# Patient Record
Sex: MALE | Race: ASIAN | NOT HISPANIC OR LATINO | Employment: FULL TIME | URBAN - METROPOLITAN AREA
[De-identification: names, ages, dates, MRNs, and addresses within clinical notes are randomized per-mention and may not be internally consistent; named-entity substitution may affect disease eponyms.]

---

## 2022-10-25 ENCOUNTER — LAB (OUTPATIENT)
Dept: LAB | Facility: HOSPITAL | Age: 47
End: 2022-10-25

## 2022-10-25 ENCOUNTER — OFFICE VISIT (OUTPATIENT)
Dept: GASTROENTEROLOGY | Facility: CLINIC | Age: 47
End: 2022-10-25

## 2022-10-25 VITALS
SYSTOLIC BLOOD PRESSURE: 123 MMHG | DIASTOLIC BLOOD PRESSURE: 80 MMHG | HEIGHT: 72 IN | HEART RATE: 54 BPM | WEIGHT: 173.2 LBS | BODY MASS INDEX: 23.46 KG/M2 | OXYGEN SATURATION: 99 % | TEMPERATURE: 97.1 F

## 2022-10-25 DIAGNOSIS — R10.13 EPIGASTRIC PAIN: Primary | ICD-10-CM

## 2022-10-25 DIAGNOSIS — R63.4 ABNORMAL WEIGHT LOSS: ICD-10-CM

## 2022-10-25 DIAGNOSIS — K85.90 ACUTE PANCREATITIS, UNSPECIFIED COMPLICATION STATUS, UNSPECIFIED PANCREATITIS TYPE: ICD-10-CM

## 2022-10-25 PROCEDURE — 83690 ASSAY OF LIPASE: CPT | Performed by: INTERNAL MEDICINE

## 2022-10-25 PROCEDURE — 36415 COLL VENOUS BLD VENIPUNCTURE: CPT | Performed by: INTERNAL MEDICINE

## 2022-10-25 PROCEDURE — 82150 ASSAY OF AMYLASE: CPT | Performed by: INTERNAL MEDICINE

## 2022-10-25 PROCEDURE — 80053 COMPREHEN METABOLIC PANEL: CPT | Performed by: INTERNAL MEDICINE

## 2022-10-25 PROCEDURE — 85027 COMPLETE CBC AUTOMATED: CPT | Performed by: INTERNAL MEDICINE

## 2022-10-25 PROCEDURE — 81377 HLA II TYPE 1 AG EQUIV LR: CPT | Performed by: INTERNAL MEDICINE

## 2022-10-25 PROCEDURE — 99204 OFFICE O/P NEW MOD 45 MIN: CPT | Performed by: INTERNAL MEDICINE

## 2022-10-25 NOTE — PROGRESS NOTES
GASTROENTEROLOGY OFFICE NOTE  John Kay  [id number]  1975        Chief Complaint   Patient presents with   • History of pancreatitis of unclear etiology requiring ERCP    • Abdominal Pain        HISTORY OF PRESENT ILLNESS:  Patient known to me.  I saw him 18 to 20 years ago when he had unexplained pancreatitis ultimately he underwent ERCP with temporary pancreatic stenting.  Pancreatic and biliary sphincterotomy were completed and after low-fat diet and abstinence from alcohol his pancreatitis resolved.  Those records are currently not available.  Those were paper records that were since acquired and then destroyed by [facility].    He states he has been doing well with nothing to suggest any recurrent pancreatitis.  In the past 2 weeks he is again noted the same pain that he had when he had pancreatitis.  It is an epigastric postprandial abdominal pain which does not reliably radiate to his back and has not been associated with dysphagia to solids or odynophagia but he has some sitophobia restricting his oral intake and therefore some appropriate weight loss as a result.    He denies dysphagia to solids, odynophagia, early satiety, melena, bright red blood per rectum, constipation or diarrhea.    He is here today for further work-up and evaluation of what appears to him to be a recurrence of previous pancreatitis.    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    ALLERGIES  No Known  "Allergies    FAMILY HISTORY:  History reviewed. No pertinent family history.    SOCIAL HISTORY  Social History     Socioeconomic History   • Marital status:    Tobacco Use   • Smoking status: Never   • Smokeless tobacco: Never   Vaping Use   • Vaping Use: Never used   Substance and Sexual Activity   • Alcohol use: Yes     Alcohol/week: 1.0 standard drink     Types: 1 Drinks containing 0.5 oz of alcohol per week     Comment: occasionally   • Drug use: Never   • Sexual activity: Defer     Socioeconomic History:  He works in [employer].   with 2 sons ages 17.  He is a non-smoker and only occasionally drinks alcoholic beverages.         PHYSICAL EXAM   /80 (BP Location: Left arm, Patient Position: Sitting, Cuff Size: Adult)   Pulse 54   Temp 97.1 °F (36.2 °C) (Infrared)   Ht 182.9 cm (72\")   Wt 78.6 kg (173 lb 3.2 oz)   SpO2 99%   BMI 23.49 kg/m²   General: Alert and oriented x 3. In no apparent or acute distress.  and No stigmata of chronic liver disease  HEENT: Anicteric sclerae. Normal oropharynx  Neck: Supple. Without lymphadenopathy  CV: Regular rate and rhythm, S1, S2  Lungs: Clear to ausculation. Without rales, rhonchi and wheezing  Abdomen: Soft abdomen normal bowel sounds but some tenderness in the epigastrium with deep palpation without rebound or guarding.  Normal bowel sounds are noted  Extremeties: without clubbing, cyanosis or edema  Neurologic:  Alert and oriented x 3 without focal motor or sensory deficits  Rectal exam: deferred       ASSESSMENT  1.-Epigastric abdominal pain.  The fact that this is reminiscent to him of the same pain that he had when he had documented pancreatitis would suggest recurrence of this problem.  I would suspect perhaps some restenosis of his pancreatic sphincterotomy.  He seems to be improving I think conservative measures might suffice.  We need to check his amylase, lipase and at this point since he is improving I will defer imaging studies such as " ultrasound or CAT scan but will reconsider given his clinical progress.  2.-History of pancreatitis status post ERCP with pancreatic and biliary sphincterotomies with temporary pancreatic stenting    PLAN  1.-  Check amylase, lipase, CBC, CMP  2.-  Defer imaging studies  3.-  Recheck amylase and lipase when symptoms exacerbate (standing order placed in chart so we can obtain these when clinically indicated)  4.-  Consider EGD if no evidence of pancreatitis but ongoing abdominal pain  5.-  Return appointment in 6 weeks.  Patient encouraged to call sooner if not progressing  6.-  May need to consider ERCP with revision of his prior sphincterotomies if clinically indicated      Norman Thacker MD  10/25/2022   18:31 EDT          Addendum  Recent labs reviewed and indeed amylases up albeit only slightly so (126)  Lipase, CBC and CBC are otherwise unremarkable but the elevated amylase would confirm that indeed recent exacerbation of symptoms reminiscent of previous bouts of pancreatitis are likely related to recurrent pancreatitis.  I suspect perhaps restenosis of prior sphincterotomy but would proceed conservatively at this time.  Imaging studies, such as CT abdomen, pancreatic protocol, for persistent/recurrent symptoms would be next best step.

## 2022-10-26 LAB
ALBUMIN SERPL-MCNC: 5 G/DL (ref 3.5–5.2)
ALBUMIN/GLOB SERPL: 2.1 G/DL
ALP SERPL-CCNC: 61 U/L (ref 39–117)
ALT SERPL W P-5'-P-CCNC: 18 U/L (ref 1–41)
AMYLASE SERPL-CCNC: 125 U/L (ref 28–100)
ANION GAP SERPL CALCULATED.3IONS-SCNC: 9 MMOL/L (ref 5–15)
AST SERPL-CCNC: 22 U/L (ref 1–40)
BILIRUB SERPL-MCNC: 1 MG/DL (ref 0–1.2)
BUN SERPL-MCNC: 12 MG/DL (ref 6–20)
BUN/CREAT SERPL: 12 (ref 7–25)
CALCIUM SPEC-SCNC: 9.5 MG/DL (ref 8.6–10.5)
CHLORIDE SERPL-SCNC: 103 MMOL/L (ref 98–107)
CO2 SERPL-SCNC: 27 MMOL/L (ref 22–29)
CREAT SERPL-MCNC: 1 MG/DL (ref 0.76–1.27)
DEPRECATED RDW RBC AUTO: 40.9 FL (ref 37–54)
EGFRCR SERPLBLD CKD-EPI 2021: 92.8 ML/MIN/1.73
ERYTHROCYTE [DISTWIDTH] IN BLOOD BY AUTOMATED COUNT: 12.3 % (ref 12.3–15.4)
GLOBULIN UR ELPH-MCNC: 2.4 GM/DL
GLUCOSE SERPL-MCNC: 78 MG/DL (ref 65–99)
HCT VFR BLD AUTO: 41.2 % (ref 37.5–51)
HGB BLD-MCNC: 14.3 G/DL (ref 13–17.7)
LIPASE SERPL-CCNC: 29 U/L (ref 13–60)
MCH RBC QN AUTO: 31.1 PG (ref 26.6–33)
MCHC RBC AUTO-ENTMCNC: 34.7 G/DL (ref 31.5–35.7)
MCV RBC AUTO: 89.6 FL (ref 79–97)
PLATELET # BLD AUTO: 205 10*3/MM3 (ref 140–450)
PMV BLD AUTO: 10.8 FL (ref 6–12)
POTASSIUM SERPL-SCNC: 4.4 MMOL/L (ref 3.5–5.2)
PROT SERPL-MCNC: 7.4 G/DL (ref 6–8.5)
RBC # BLD AUTO: 4.6 10*6/MM3 (ref 4.14–5.8)
SODIUM SERPL-SCNC: 139 MMOL/L (ref 136–145)
WBC NRBC COR # BLD: 6.1 10*3/MM3 (ref 3.4–10.8)

## 2022-10-26 NOTE — PROGRESS NOTES
Recent labs reviewed and indeed amylases up albeit only slightly so.  Lipase, CBC and CBC are otherwise unremarkable but the elevated amylase would confirm that indeed recent exacerbation of symptoms reminiscent of previous bouts of pancreatitis are likely related to recurrent pancreatitis.  I suspect perhaps restenosis of prior sphincterotomy but would proceed conservatively at this time.  Imaging studies, such as CT abdomen, pancreatic protocol, for persistent/recurrent symptoms would be next best step.

## 2022-11-07 LAB
ANNOTATION COMMENT IMP: NORMAL
HLA-DQ8 QL: NEGATIVE
REF LAB TEST METHOD: NORMAL

## 2022-12-20 ENCOUNTER — OFFICE VISIT (OUTPATIENT)
Dept: GASTROENTEROLOGY | Facility: CLINIC | Age: 47
End: 2022-12-20

## 2022-12-20 VITALS
WEIGHT: 175.4 LBS | DIASTOLIC BLOOD PRESSURE: 90 MMHG | HEIGHT: 72 IN | SYSTOLIC BLOOD PRESSURE: 136 MMHG | OXYGEN SATURATION: 99 % | BODY MASS INDEX: 23.76 KG/M2 | HEART RATE: 66 BPM

## 2022-12-20 DIAGNOSIS — K85.90 ACUTE PANCREATITIS, UNSPECIFIED COMPLICATION STATUS, UNSPECIFIED PANCREATITIS TYPE: ICD-10-CM

## 2022-12-20 DIAGNOSIS — Z78.9 AT AVERAGE RISK FOR COLON CANCER: ICD-10-CM

## 2022-12-20 DIAGNOSIS — R10.13 EPIGASTRIC PAIN: Primary | ICD-10-CM

## 2022-12-20 DIAGNOSIS — R63.4 ABNORMAL WEIGHT LOSS: ICD-10-CM

## 2022-12-20 PROCEDURE — 99213 OFFICE O/P EST LOW 20 MIN: CPT | Performed by: INTERNAL MEDICINE

## 2022-12-20 NOTE — PROGRESS NOTES
GASTROENTEROLOGY OFFICE NOTE  John Kay  [id number]  1975      Chief Complaint   Patient presents with   • Pt is here for 6wk F/U for pancreatitis        HISTORY OF PRESENT ILLNESS:  Patient presents for follow-up.  History of unexplained pancreatitis ultimately resolved with biliary and pancreatic sphincterotomies and temporary pancreatic stenting.  Low-fat diet and abstinence from alcohol ultimately resolved these issues.    Had been doing well for a very long time, close to 18 years or so but when I saw him October 26 he been having 2 weeks of abdominal pain which was reminiscent of the pain he had when he had documented pancreatitis.  He was an epigastric postprandial abdominal pain with occasional radiation to the back and not associate with any other localizing GI complaints although he was having some fear of eating, sitophobia, and had restricted his oral intake with some appropriate weight loss.    Conservative management was undertaken without imaging studies.  Labs reveal clinically insignificant elevation of amylase and normal lipase.  Symptoms have been subsiding when we last saw him.    He states that over the last 2 weeks he has been doing well and even eating foods such as fatty foods that he thought might exacerbate his symptoms and he has been tolerating these foods well without any complaints.    For the most part he presents without any new complaints and feels that his presenting complaints have resolved.    PAST MEDICAL HISTORY  History of unexplained pancreatitis resolved following biliary and pancreatic sphincterotomies    PAST SURGICAL HISTORY  Past Surgical History:   • ERCP WITH SPHINCTEROTOMY/PAPILLOTOMY    Early 2000's   • UPPER GASTROINTESTINAL ENDOSCOPY        MEDICATIONS:  No current outpatient medications on file.    ALLERGIES  has No Known Allergies.    FAMILY HISTORY:  Cancer-related family history is not on file.  Colon Cancer-related family history is not on file.    SOCIAL  "HISTORY  He  reports that he has never smoked. He has never used smokeless tobacco. He reports current alcohol use of about 1.0 standard drink per week. He reports that he does not use drugs.   Works in software.   with sons ages 17.  Non-smoker.    PHYSICAL EXAM   /90 (BP Location: Right arm, Patient Position: Sitting, Cuff Size: Adult)   Pulse 66   Ht 182.9 cm (72\")   Wt 79.6 kg (175 lb 6.4 oz)   SpO2 99%   BMI 23.79 kg/m²   General: Pleasant male in no apparent acute distress  HEENT: Anicteric sclera  Neck: Without observed rigidity  Lungs: Grossly normal respiration without labored breathing observed  Abdomen: Without visible distention.  Extremeties: Moving extremities normally  Neurologic: Normal cognition and affect.  In good spirits.  Alert and oriented        ASSESSMENT  1.-  Spontaneous resolution of abdominal pain reminiscent of his pancreatitis type pain.  In the absence of any alarm symptoms further work-up or evaluation is not necessary at this time but imaging studies and consideration of repeat ERCP for possible restenosis should be considered if symptoms recur.  2.-  Average risk for cancer.  Colonoscopy for routine colon cancer screening recommended.  He is amenable to this  3.-  History of post ERCP pancreatitis    PLAN  1.-  Schedule screening colonoscopy  2.-  Conservative management of his now resolved abdominal pain consider imaging studies such as MRCP versus pancreatic protocol CT and possible ERCP pending his clinical progress      Norman Thacker MD  12/20/2022   16:31 EST                  "

## 2022-12-30 RX ORDER — SODIUM, POTASSIUM,MAG SULFATES 17.5-3.13G
SOLUTION, RECONSTITUTED, ORAL ORAL
Qty: 354 ML | Refills: 0 | Status: SHIPPED | OUTPATIENT
Start: 2022-12-30

## 2023-10-13 ENCOUNTER — TRANSCRIBE ORDERS (OUTPATIENT)
Dept: LAB | Facility: HOSPITAL | Age: 48
End: 2023-10-13
Payer: COMMERCIAL

## 2023-10-13 DIAGNOSIS — Z12.5 SPECIAL SCREENING FOR MALIGNANT NEOPLASM OF PROSTATE: ICD-10-CM

## 2023-10-13 DIAGNOSIS — N40.0 ENLARGED PROSTATE: Primary | ICD-10-CM

## 2023-10-14 ENCOUNTER — LAB (OUTPATIENT)
Dept: LAB | Facility: HOSPITAL | Age: 48
End: 2023-10-14
Payer: COMMERCIAL

## 2023-10-14 DIAGNOSIS — N40.0 ENLARGED PROSTATE: ICD-10-CM

## 2023-10-14 DIAGNOSIS — R10.13 EPIGASTRIC PAIN: ICD-10-CM

## 2023-10-14 DIAGNOSIS — Z12.5 SPECIAL SCREENING FOR MALIGNANT NEOPLASM OF PROSTATE: ICD-10-CM

## 2023-10-14 LAB
AMYLASE SERPL-CCNC: 120 U/L (ref 28–100)
BASOPHILS # BLD AUTO: 0.05 10*3/MM3 (ref 0–0.2)
BASOPHILS NFR BLD AUTO: 0.5 % (ref 0–1.5)
BUN SERPL-MCNC: 18 MG/DL (ref 6–20)
CALCIUM SPEC-SCNC: 9.9 MG/DL (ref 8.6–10.5)
CREAT SERPL-MCNC: 0.98 MG/DL (ref 0.76–1.27)
DEPRECATED RDW RBC AUTO: 40.6 FL (ref 37–54)
EGFRCR SERPLBLD CKD-EPI 2021: 94.5 ML/MIN/1.73
EOSINOPHIL # BLD AUTO: 0.35 10*3/MM3 (ref 0–0.4)
EOSINOPHIL NFR BLD AUTO: 3.8 % (ref 0.3–6.2)
ERYTHROCYTE [DISTWIDTH] IN BLOOD BY AUTOMATED COUNT: 12.2 % (ref 12.3–15.4)
HCT VFR BLD AUTO: 43.9 % (ref 37.5–51)
HGB BLD-MCNC: 14.8 G/DL (ref 13–17.7)
IMM GRANULOCYTES # BLD AUTO: 0.05 10*3/MM3 (ref 0–0.05)
IMM GRANULOCYTES NFR BLD AUTO: 0.5 % (ref 0–0.5)
LIPASE SERPL-CCNC: 27 U/L (ref 13–60)
LYMPHOCYTES # BLD AUTO: 2.66 10*3/MM3 (ref 0.7–3.1)
LYMPHOCYTES NFR BLD AUTO: 29.2 % (ref 19.6–45.3)
MCH RBC QN AUTO: 30.4 PG (ref 26.6–33)
MCHC RBC AUTO-ENTMCNC: 33.7 G/DL (ref 31.5–35.7)
MCV RBC AUTO: 90.1 FL (ref 79–97)
MONOCYTES # BLD AUTO: 0.59 10*3/MM3 (ref 0.1–0.9)
MONOCYTES NFR BLD AUTO: 6.5 % (ref 5–12)
NEUTROPHILS NFR BLD AUTO: 5.42 10*3/MM3 (ref 1.7–7)
NEUTROPHILS NFR BLD AUTO: 59.5 % (ref 42.7–76)
NRBC BLD AUTO-RTO: 0 /100 WBC (ref 0–0.2)
PLATELET # BLD AUTO: 234 10*3/MM3 (ref 140–450)
PMV BLD AUTO: 9.9 FL (ref 6–12)
PSA SERPL-MCNC: 0.68 NG/ML (ref 0–4)
RBC # BLD AUTO: 4.87 10*6/MM3 (ref 4.14–5.8)
WBC NRBC COR # BLD: 9.12 10*3/MM3 (ref 3.4–10.8)

## 2023-10-14 PROCEDURE — 82565 ASSAY OF CREATININE: CPT

## 2023-10-14 PROCEDURE — 84520 ASSAY OF UREA NITROGEN: CPT

## 2023-10-14 PROCEDURE — 36415 COLL VENOUS BLD VENIPUNCTURE: CPT

## 2023-10-14 PROCEDURE — 82310 ASSAY OF CALCIUM: CPT

## 2023-10-14 PROCEDURE — 83690 ASSAY OF LIPASE: CPT

## 2023-10-14 PROCEDURE — 82150 ASSAY OF AMYLASE: CPT

## 2023-10-14 PROCEDURE — 85025 COMPLETE CBC W/AUTO DIFF WBC: CPT

## 2023-10-14 PROCEDURE — G0103 PSA SCREENING: HCPCS

## 2023-10-17 ENCOUNTER — TELEPHONE (OUTPATIENT)
Dept: GASTROENTEROLOGY | Facility: CLINIC | Age: 48
End: 2023-10-17
Payer: COMMERCIAL

## 2023-10-17 NOTE — TELEPHONE ENCOUNTER
"Notified Pt of results per Dr. Thacker.   Pt stated that he had labs drawn by his PCP and that they tho his labs from Dr. Thacker and he did not tell them too. The labs were only for if he was having symptoms. He stated he was not having any symptoms and said that he had not ate 10 hours prior to the labs.  He said he still is not having any symptoms and no pain.    His labs are basically normal.  He is not having any symptoms.  I do not see any need for any further work-up or evaluation.  He just needs to keep us updated if and when his symptoms recur.  Tell him I said \"hi \"!  "

## 2023-10-17 NOTE — PROGRESS NOTES
Patient with longstanding history of unexplained pancreatitis.  Very mild elevation of amylase noted unlikely to suggest acute pancreatitis (typically elevations 3 times greater than the upper limits of normal is required for a diagnosis of acute Pancreatitis)    Josephien: Please call patient and let me know that amylase was drawn when he was symptomatic and if he remains symptomatic so we can decide what further work-up, if any is necessary.

## 2023-10-17 NOTE — TELEPHONE ENCOUNTER
----- Message from Norman Thacker MD sent at 10/17/2023  7:15 AM EDT -----  Patient with longstanding history of unexplained pancreatitis.  Very mild elevation of amylase noted unlikely to suggest acute pancreatitis (typically elevations 3 times greater than the upper limits of normal is required for a diagnosis of acute Pancreatitis)    Josephine: Please call patient and let me know that amylase was drawn when he was symptomatic and if he remains symptomatic so we can decide what further work-up, if any is necessary.

## 2024-02-02 ENCOUNTER — OFFICE VISIT (OUTPATIENT)
Dept: INTERNAL MEDICINE | Facility: CLINIC | Age: 49
End: 2024-02-02
Payer: COMMERCIAL

## 2024-02-02 VITALS
WEIGHT: 182.2 LBS | RESPIRATION RATE: 18 BRPM | BODY MASS INDEX: 26.08 KG/M2 | DIASTOLIC BLOOD PRESSURE: 82 MMHG | SYSTOLIC BLOOD PRESSURE: 132 MMHG | HEIGHT: 70 IN | HEART RATE: 76 BPM | TEMPERATURE: 97.5 F

## 2024-02-02 DIAGNOSIS — M75.21 BICEPS TENDONITIS ON RIGHT: ICD-10-CM

## 2024-02-02 DIAGNOSIS — N40.1 BENIGN PROSTATIC HYPERPLASIA WITH INCOMPLETE BLADDER EMPTYING: ICD-10-CM

## 2024-02-02 DIAGNOSIS — Z00.00 HEALTHCARE MAINTENANCE: Primary | ICD-10-CM

## 2024-02-02 DIAGNOSIS — Z23 ENCOUNTER FOR VACCINATION: ICD-10-CM

## 2024-02-02 DIAGNOSIS — R39.14 BENIGN PROSTATIC HYPERPLASIA WITH INCOMPLETE BLADDER EMPTYING: ICD-10-CM

## 2024-02-02 DIAGNOSIS — Z87.19 HISTORY OF PANCREATITIS: ICD-10-CM

## 2024-02-02 DIAGNOSIS — M12.811 ROTATOR CUFF ARTHROPATHY OF RIGHT SHOULDER: ICD-10-CM

## 2024-02-02 PROBLEM — K85.90 ACUTE PANCREATITIS: Status: RESOLVED | Noted: 2022-10-25 | Resolved: 2024-02-02

## 2024-02-02 PROBLEM — R63.4 ABNORMAL WEIGHT LOSS: Status: RESOLVED | Noted: 2022-10-25 | Resolved: 2024-02-02

## 2024-02-02 PROBLEM — R10.13 EPIGASTRIC PAIN: Status: RESOLVED | Noted: 2022-10-25 | Resolved: 2024-02-02

## 2024-02-02 NOTE — LETTER
[facility]  Vaccine Consent Form    Patient Name:  John Kay  Patient :  1975     Vaccine(s) Ordered    Fluzone (or Fluarix & Flulaval for VFC) >6mos  Tdap Vaccine Greater Than or Equal To 6yo IM        Screening Checklist  The following questions should be completed prior to vaccination. If you answer “yes” to any question, it does not necessarily mean you should not be vaccinated. It just means we may need to clarify or ask more questions. If a question is unclear, please ask your healthcare provider to explain it.    Yes No   Any fever or moderate to severe illness today (mild illness and/or antibiotic treatment are not contraindications)?     Do you have a history of a serious reaction to any previous vaccinations, such as anaphylaxis, encephalopathy within 7 days, Guillain-Olustee syndrome within 6 weeks, seizure?     Have you received any live vaccine(s) (e.g MMR, AMY) or any other vaccines in the last month (to ensure duplicate doses aren't given)?     Do you have an anaphylactic allergy to latex (DTaP, DTaP-IPV, Hep A, Hep B, MenB, RV, Td, Tdap), baker’s yeast (Hep B, HPV), polysorbates (RSV, nirsevimab, PCV 20, Rotavirrus, Tdap, Shingrix), or gelatin (AMY, MMR)?     Do you have an anaphylactic allergy to neomycin (Rabies, AMY, MMR, IPV, Hep A), polymyxin B (IPV), or streptomycin (IPV)?      Any cancer, leukemia, AIDS, or other immune system disorder? (AMY, MMR, RV)     Do you have a parent, brother, or sister with an immune system problem (if immune competence of vaccine recipient clinically verified, can proceed)? (MMR, AMY)     Any recent steroid treatments for >2 weeks, chemotherapy, or radiation treatment? (AMY, MMR)     Have you received antibody-containing blood transfusions or IVIG in the past 11 months (recommended interval is dependent on product)? (MMR, AMY)     Have you taken antiviral drugs (acyclovir, famciclovir, valacyclovir for AMY) in the last 24 or 48 hours, respectively?     "  Are you pregnant or planning to become pregnant within 1 month? (AMY, MMR, HPV, IPV, MenB, Abrexvy; For Hep B- refer to Engerix-B; For RSV - Abrysvo is indicated for 32-36 weeks of pregnancy from September to January)     For infants, have you ever been told your child has had intussusception or a medical emergency involving obstruction of the intestine (Rotavirus)? If not for an infant, can skip this question.         *Ordering Physicians/APC should be consulted if \"yes\" is checked by the patient or guardian above.  I have received, read, and understand the Vaccine Information Statement (VIS) for each vaccine ordered.  I have considered my or my child's health status as well as the health status of my close contacts.  I have taken the opportunity to discuss my vaccine questions with my or my child's health care provider.   I have requested that the ordered vaccine(s) be given to me or my child.  I understand the benefits and risks of the vaccines.  I understand that I should remain in the clinic for 15 minutes after receiving the vaccine(s).  _________________________________________________________  Signature of Patient or Parent/Legal Guardian ____________________  Date     "

## 2024-02-02 NOTE — PROGRESS NOTES
New Patient Office Visit      Date: 2024   Patient Name: John Kay  : 1975   MRN: [id number]     Chief Complaint:    Chief Complaint   Patient presents with    Shoulder Injury     Rt  Establish care       History of Present Illness: John Kay is a 48 y.o. male who is here today to establish care.    Health maintenance  He denies having been to primary care for approximately 19 years. He does not feel unsafe in any situations or is a victim of any abuse. He sees a dentist. His vision is well. He sees Dr. Malagon for his colonoscopy and did not have any polyps. He recently had a normal PSA test.    Right shoulder pain  He has been experiencing complications with his right shoulder for approximately the last couple of years. He had an injury while working outside and was pulling something and it snapped. His right shoulder and he is now doing CrossFit and exercises that he had never done prior. He has had to go to physical therapy in the past for the pain in his neck and right shoulder; however, he feels a significant amount of it has to do with his posture and sleeping. Even though there was an injury, it was not healing and the way he was sleeping seemed to affect it. His biggest complaint currently is the pain in his neck and right shoulder. For the first time in approximately 2 months, he has slept through the night without pain for 2 nights in a row without taking Advil. When he went to physical therapy it helped; however, until he figured out the sleeping position, it did not make an enormous difference. He has been feeling well for approximately the last few days. He has done a month of physical therapy, and he has done all the exercises that he was told to do at home. He was doing empty barbells for an extended period; however, this week he started placing weight back on it. Yesterday, 2024, he did bench with weights, and he felt better the next day with less pain.      Pancreatitis  He has had pancreatitis in the past; however, he is unsure what caused it. He denies being an alcoholic and has discontinued drinking alcohol. He is requesting to have his amylase and lipase checked whenever he has his laboratories completed. The last time his lipase was elevated when he had completed a fasting laboratory for a prostate screening. His doctor told him that his lipids were elevated; however, not elevated enough that he thought he should come in. He had an endoscope completed approximately 20 years ago. He inquires if there is a diet change, he should be making to help with possible gallstones. When he initially had these complications, he could tell right away if he ate anything with carbohydrates or fat. This last year, 2023, was significantly less severe.  He inquires if there is a relation in pancreatitis and pancreatic cancer.     Enlarged prostate  He had an ultrasound in 10/2023 as part of a trial study and was told that he had a slightly enlarged prostate. The company did not want to do the trial because they did not think his prostate was large enough. The ultrasound also showed that he had urine in his bladder when he evacuated normally. . He is requesting us to move forward with a TURP procedure rather than take medication. He denies that it disrupts his life currently. When he goes to the bathroom, he does not have complete emptying. He denies having a urologist.     Hepatitis  He used to donate blood in his early 30s. He received a call from the blood bank and was told that he had tested positive for hepatitis. He called Dr. Malagon, and he tested him for hepatitis; however, it came back negative. He has denied testing for it since then.     Social history  He works at a desk all day.   Non-smoker.     Family history  His father has a history of prostate enlargement.    Immunizations  He is getting tetanus and influenza vaccine today.     Subjective      Review of  Systems:   Review of Systems   All other systems reviewed and are negative.      Past Medical History:   Past Medical History:   Diagnosis Date    Acute pancreatitis     Allergic     Diabetes mellitus        Past Surgical History:   Past Surgical History:   Procedure Laterality Date    ERCP WITH SPHINCTEROTOMY/PAPILLOTOMY      Early 2000's    KNEE ACL RECONSTRUCTION Left     UPPER GASTROINTESTINAL ENDOSCOPY         Family History:   Family History   Problem Relation Age of Onset    Hearing loss Father     Hyperlipidemia Father        Social History:   Social History     Socioeconomic History    Marital status:    Tobacco Use    Smoking status: Never     Passive exposure: Never    Smokeless tobacco: Never   Vaping Use    Vaping Use: Never used   Substance and Sexual Activity    Alcohol use: Not Currently     Alcohol/week: 1.0 standard drink of alcohol     Types: 1 Drinks containing 0.5 oz of alcohol per week     Comment: occasionally    Drug use: Never    Sexual activity: Yes     Partners: Female       Medications:   No current outpatient medications on file.    Allergies:   Allergies   Allergen Reactions    Shellfish Allergy Swelling       Immunizations:  Immunization History   Administered Date(s) Administered    COVID-19 (MODERNA) 1st,2nd,3rd Dose Monovalent 03/02/2021, 03/31/2021    COVID-19 (MODERNA) BIVALENT 12+YRS 01/23/2023    COVID-19 (MODERNA) Monovalent Original Booster 11/12/2021    Covid-19 (Pfizer) Gray Cap Monovalent 06/05/2022    Flublok 18+yrs 10/25/2022    Fluzone (or Fluarix & Flulaval for VFC) >6mos 12/01/2015, 01/02/2019, 01/03/2020, 10/16/2020, 11/03/2021, 02/02/2024    Tdap 02/02/2024        Colorectal Screening: Up-to-date  Last Completed Colonoscopy            COLORECTAL CANCER SCREENING (COLONOSCOPY - Every 10 Years) Next due on 1/11/2033 01/11/2023  SCANNED - COLONOSCOPY                  CT for Smoker (Age 50-80, 20pk yr within last 15 years): N/A  Bone Density/DEXA (high  "risk): Not applicable  Hep C (Age 18-79 once): Ordered  HIV (Age 15-65 once) :N/A  PSA (Over age 50, C Level Recommendation): Did not order  US Aorta (For male smokers, age 65): Not applicable  A1c: Ordered  Lipid panel: Ordered    Dermatology: pending  Ophthalmologist: Established  Dentist: Established    Tobacco Use: Low Risk  (2/5/2024)    Patient History     Smoking Tobacco Use: Never     Smokeless Tobacco Use: Never     Passive Exposure: Never       Social History     Substance and Sexual Activity   Alcohol Use Not Currently    Alcohol/week: 1.0 standard drink of alcohol    Types: 1 Drinks containing 0.5 oz of alcohol per week    Comment: occasionally        Social History     Substance and Sexual Activity   Drug Use Never        Diet/Physical activity: Counseled on 02/05/24    PHQ-2 Depression Screening  PHQ-9 Total Score: 0       Intimate partner violence: (Screen on initial visit, older adult with injury or evidence of neglect): No concerns  Violence can be a problem in many people's lives, so I now ask every patient about trauma or abuse they may have experienced in a relationship.  Stress/Safety - Do you feel safe in your relationship?  Afraid/Abused - Have you ever been in a relationship where you were threatened, hurt, or afraid?  Friend/Family - Are your friends aware you have been hurt?  Emergency Plan - Do you have a safe place to go and the resources you need in an emergency?    Osteoporosis: No concerns  Men: history of low trauma fracture, androgen deprivation therapy for prostate cancer, hypogonadism, primary hyperparathyroidism, intestinal disorders.     Objective     Physical Exam:  Vital Signs:   Vitals:    02/02/24 1347   BP: 132/82   BP Location: Right arm   Patient Position: Sitting   Cuff Size: Adult   Pulse: 76   Resp: 18   Temp: 97.5 °F (36.4 °C)   TempSrc: Temporal   Weight: 82.6 kg (182 lb 3.2 oz)   Height: 179 cm (70.47\")   PainSc: 0-No pain     Body mass index is 25.79 " "kg/m².    Physical Exam  Vitals and nursing note reviewed.   Constitutional:       General: He is not in acute distress.     Appearance: Normal appearance. He is normal weight. He is not ill-appearing or toxic-appearing.   HENT:      Nose: No congestion or rhinorrhea.   Eyes:      General:         Right eye: No discharge.         Left eye: No discharge.      Conjunctiva/sclera: Conjunctivae normal.   Cardiovascular:      Rate and Rhythm: Normal rate and regular rhythm.      Heart sounds: Normal heart sounds. No murmur heard.     No friction rub.   Pulmonary:      Effort: Pulmonary effort is normal. No respiratory distress.      Breath sounds: Normal breath sounds. No wheezing or rhonchi.   Abdominal:      General: Abdomen is flat. Bowel sounds are normal. There is no distension.      Palpations: Abdomen is soft. There is no mass.      Tenderness: There is no abdominal tenderness. There is no guarding or rebound.   Musculoskeletal:         General: Tenderness (biceps tendon) present.      Cervical back: Normal range of motion.      Right lower leg: No edema.      Left lower leg: No edema.      Comments: He has biceps tendinitis and rotator cuff arthropathy.   Skin:     Findings: No lesion or rash.   Neurological:      General: No focal deficit present.      Mental Status: He is alert. Mental status is at baseline.      Coordination: Coordination normal.      Gait: Gait normal.   Psychiatric:         Mood and Affect: Mood normal.         Behavior: Behavior normal.         Thought Content: Thought content normal.         Judgment: Judgment normal.         Procedures    Results:     Labs:   No results found for: \"HGBA1C\", \"CMP\", \"CBCDIFFPANEL\", \"CREAT\", \"TSH\"     Imaging:   No valid procedures specified.     Assessment / Plan      Assessment/Plan:   Problem List Items Addressed This Visit       History of pancreatitis    Current Assessment & Plan     Recommend a low fat diet to decrease the likelihood of gallstones.  If " symptoms return or his lipase is high on labs, we can perform an ultrasound of the gallbladder or refer to GI.         Relevant Orders    Lipase    Benign prostatic hyperplasia with incomplete bladder emptying    Relevant Orders    Ambulatory Referral to Urology    Biceps tendonitis on right    Current Assessment & Plan     He will continue to move forward with physical therapy.   I will obtain an x-ray to make sure there are no specific structural changes that we can see with bony abnormalities.   I can refer him to orthopedic surgery to discuss injections and procedures.  In the meantime, he can use Voltaren gel.           Rotator cuff arthropathy of right shoulder     Other Visit Diagnoses       Healthcare maintenance    -  Primary    Send in prescription for COVID-19 vaccine.Check for the antibody is present to indicate chronic hepatitis C. If positive, follow up with QuantiFERON viral load.    Relevant Orders    Lipid Panel    POC Glycosylated Hemoglobin (Hb A1C)    Hepatitis C Antibody    Comprehensive Metabolic Panel    Encounter for vaccination        Relevant Orders    Fluzone (or Fluarix & Flulaval for VFC) >6mos (Completed)    Tdap Vaccine Greater Than or Equal To 8yo IM (Completed)          Healthcare Maintenance:  Counseling provided based on age appropriate USPSTF guidelines.  BMI is within normal parameters. No other follow-up for BMI required.    John Kay voices understanding and acceptance of this advice and will call back with any further questions or concerns. AVS with preventive healthcare tips printed for patient.     “Discussed risks/benefits to vaccination, reviewed components of the vaccine, discussed VIS, discussed informed consent, informed consent obtained. Patient/Parent was allowed to accept or refuse vaccine. Questions answered to satisfactory state of patient/Parent. We reviewed typical age appropriate and seasonally appropriate vaccinations. Reviewed immunization history and  updated state vaccination form as needed. Patient was counseled on COVID-19  Influenza    Follow Up:   Return in about 4 weeks (around 3/1/2024) for Recheck.      Christopher Burrows MD  [facility]     Transcribed from ambient dictation for Christopher Burrows MD by Nicole Davenport.  02/05/24   12:12 EST    Patient or patient representative verbalized consent to the visit recording.  I have personally performed the services described in this document as transcribed by the above individual, and it is both accurate and complete.

## 2024-02-05 NOTE — ASSESSMENT & PLAN NOTE
He will continue to move forward with physical therapy.   I will obtain an x-ray to make sure there are no specific structural changes that we can see with bony abnormalities.   I can refer him to orthopedic surgery to discuss injections and procedures.  In the meantime, he can use Voltaren gel.

## 2024-02-05 NOTE — ASSESSMENT & PLAN NOTE
Recommend a low fat diet to decrease the likelihood of gallstones.  If symptoms return or his lipase is high on labs, we can perform an ultrasound of the gallbladder or refer to GI.

## 2024-02-19 ENCOUNTER — LAB (OUTPATIENT)
Dept: LAB | Facility: HOSPITAL | Age: 49
End: 2024-02-19
Payer: COMMERCIAL

## 2024-02-19 DIAGNOSIS — Z00.00 HEALTHCARE MAINTENANCE: ICD-10-CM

## 2024-02-19 DIAGNOSIS — Z87.19 HISTORY OF PANCREATITIS: ICD-10-CM

## 2024-02-19 LAB
ALBUMIN SERPL-MCNC: 4.3 G/DL (ref 3.5–5.2)
ALBUMIN/GLOB SERPL: 1.5 G/DL
ALP SERPL-CCNC: 57 U/L (ref 39–117)
ALT SERPL W P-5'-P-CCNC: 29 U/L (ref 1–41)
ANION GAP SERPL CALCULATED.3IONS-SCNC: 10 MMOL/L (ref 5–15)
AST SERPL-CCNC: 25 U/L (ref 1–40)
BILIRUB SERPL-MCNC: 1 MG/DL (ref 0–1.2)
BUN SERPL-MCNC: 17 MG/DL (ref 6–20)
BUN/CREAT SERPL: 15.9 (ref 7–25)
CALCIUM SPEC-SCNC: 9.3 MG/DL (ref 8.6–10.5)
CHLORIDE SERPL-SCNC: 105 MMOL/L (ref 98–107)
CHOLEST SERPL-MCNC: 232 MG/DL (ref 0–200)
CO2 SERPL-SCNC: 25 MMOL/L (ref 22–29)
CREAT SERPL-MCNC: 1.07 MG/DL (ref 0.76–1.27)
EGFRCR SERPLBLD CKD-EPI 2021: 85.1 ML/MIN/1.73
GLOBULIN UR ELPH-MCNC: 2.8 GM/DL
GLUCOSE SERPL-MCNC: 93 MG/DL (ref 65–99)
HCV AB SER DONR QL: NORMAL
HDLC SERPL-MCNC: 37 MG/DL (ref 40–60)
LDLC SERPL CALC-MCNC: 165 MG/DL (ref 0–100)
LDLC/HDLC SERPL: 4.39 {RATIO}
LIPASE SERPL-CCNC: 23 U/L (ref 13–60)
POTASSIUM SERPL-SCNC: 4.3 MMOL/L (ref 3.5–5.2)
PROT SERPL-MCNC: 7.1 G/DL (ref 6–8.5)
SODIUM SERPL-SCNC: 140 MMOL/L (ref 136–145)
TRIGL SERPL-MCNC: 162 MG/DL (ref 0–150)
VLDLC SERPL-MCNC: 30 MG/DL (ref 5–40)

## 2024-02-19 PROCEDURE — 86803 HEPATITIS C AB TEST: CPT

## 2024-02-19 PROCEDURE — 83690 ASSAY OF LIPASE: CPT

## 2024-02-19 PROCEDURE — 80053 COMPREHEN METABOLIC PANEL: CPT

## 2024-02-19 PROCEDURE — 36415 COLL VENOUS BLD VENIPUNCTURE: CPT

## 2024-02-19 PROCEDURE — 80061 LIPID PANEL: CPT

## 2024-02-20 PROBLEM — E78.2 MIXED HYPERLIPIDEMIA: Status: ACTIVE | Noted: 2024-02-20

## 2024-03-21 ENCOUNTER — OFFICE VISIT (OUTPATIENT)
Dept: ORTHOPEDIC SURGERY | Facility: CLINIC | Age: 49
End: 2024-03-21
Payer: COMMERCIAL

## 2024-03-21 VITALS
WEIGHT: 182.4 LBS | BODY MASS INDEX: 26.11 KG/M2 | SYSTOLIC BLOOD PRESSURE: 120 MMHG | DIASTOLIC BLOOD PRESSURE: 76 MMHG | HEIGHT: 70 IN

## 2024-03-21 DIAGNOSIS — M75.42 IMPINGEMENT SYNDROME OF LEFT SHOULDER: ICD-10-CM

## 2024-03-21 DIAGNOSIS — M75.51 BURSITIS OF RIGHT SHOULDER: ICD-10-CM

## 2024-03-21 DIAGNOSIS — M75.21 BICEPS TENDINITIS OF RIGHT UPPER EXTREMITY: ICD-10-CM

## 2024-03-21 DIAGNOSIS — M75.41 IMPINGEMENT SYNDROME OF RIGHT SHOULDER: ICD-10-CM

## 2024-03-21 DIAGNOSIS — S46.001A INJURY OF RIGHT ROTATOR CUFF, INITIAL ENCOUNTER: ICD-10-CM

## 2024-03-21 DIAGNOSIS — M25.511 RIGHT SHOULDER PAIN, UNSPECIFIED CHRONICITY: Primary | ICD-10-CM

## 2024-03-21 NOTE — PROGRESS NOTES
"                                                                    [facility] [place] [facility] [place] - Jayro Williamson MD    Office Visit       Patient Name: John Kay    Chief Complaint:   Chief Complaint   Patient presents with    Right Shoulder - Pain    Left Shoulder - Pain       Referring Physician: Referring, Self    History of Present Illness:   John Kay is a 48 y.o. male who presents with bilateral body part: shoulder Reason: pain.  Onset:injury/direct blow. The issue has been ongoing for 5 month(s). Pain is a 3/10 on the pain scale. Pain is described as Pain Characterization: dull, aching, and sharp with certain movements . Associated symptoms include Symptoms: pain and popping. The pain is worse with sleeping, leisure, and any movement of the joint; ice and pain medication and/or NSAID improve the pain. Previous treatments have included: NSAIDS and physical therapy. I have reviewed the patient's history of present illness as noted/entered above.    I have reviewed the patient's past medical history, surgical history, social history, family history, medications, and allergies as noted in the electronic medical record and as noted/entered.  I have reviewed the patient's review of systems as noted/enter and updated as noted in the patient's HPI.      RIGHT shoulder and left shoulder  Has enjoyed CrossFit for some time, 859  He is felt that most of the issues have been with his biceps he has been concerned about possible biceps popping.  Right worse than left shoulder pain.      He did have an injury in the yard pulling injury felt a snap and jerk.    Hanging x15 minutes; the hanging program has been helpful he notes.  \"Terence-eet\"  Stays active, fit      48 y.o. male  Body mass index is 25.82 kg/m².    Subjective   Subjective      Review of Systems   Constitutional: Negative.  Negative for chills, fatigue and fever.   HENT: Negative.  Negative for congestion and dental problem.    Eyes: Negative.  " Negative for blurred vision.   Respiratory: Negative.  Negative for shortness of breath.    Cardiovascular: Negative.  Negative for leg swelling.   Gastrointestinal: Negative.  Negative for abdominal pain.   Endocrine: Negative.  Negative for polyuria.   Genitourinary: Negative.  Negative for difficulty urinating.   Musculoskeletal:  Positive for arthralgias.   Skin: Negative.    Allergic/Immunologic: Negative.    Neurological: Negative.    Hematological: Negative.  Negative for adenopathy.   Psychiatric/Behavioral: Negative.  Negative for behavioral problems.         Past Medical History:   Past Medical History:   Diagnosis Date    Acute pancreatitis     Allergic     Diabetes mellitus     Tear of meniscus of knee 1996    Tennis elbow 2015       Past Surgical History:   Past Surgical History:   Procedure Laterality Date    ERCP WITH SPHINCTEROTOMY/PAPILLOTOMY      Early 2000's    KNEE ACL RECONSTRUCTION Left     UPPER GASTROINTESTINAL ENDOSCOPY         Family History:   Family History   Problem Relation Age of Onset    Hearing loss Father     Hyperlipidemia Father        Social History:   Social History     Socioeconomic History    Marital status:    Tobacco Use    Smoking status: Never     Passive exposure: Never    Smokeless tobacco: Never   Vaping Use    Vaping status: Never Used   Substance and Sexual Activity    Alcohol use: Not Currently     Alcohol/week: 1.0 standard drink of alcohol     Comment: occasionally    Drug use: Never    Sexual activity: Yes     Partners: Female       Medications: No current outpatient medications on file.    Allergies:   Allergies   Allergen Reactions    Shellfish Allergy Swelling       The following portions of the patient's history were reviewed and updated as appropriate: allergies, current medications, past family history, past medical history, past social history, past surgical history and problem list.        Objective    Objective      Vital Signs:   Vitals:     "03/21/24 1401   BP: 120/76   Weight: 82.7 kg (182 lb 6.4 oz)   Height: 179 cm (70.47\")       Ortho Exam:  RIGHT SHOULDER worse than left shoulder  General: no acute distress, comfortable  Vitals reviewed in chart    Musculoskeletal Exam    SIDE: RIGHT SHOULDER  Shoulder Exam:    Tenderness: rotator cuff, biceps    Range of motion measurements (degrees)  Forward flexion/Abduction/External rotation at side/ER at 90/IR at 90/IR position  Active: 140/140/50/80/70; impingement  Passive: same    Painful arc of motion: yes  No evidence of septic joint  Pain with forward flexion and abduction greater: 90 degrees  Impingement testing Neer's test - positive/painful  Impingement testing Hawkin's test - positive/painful    Rotator Cuff Testing:  Tenderness to palpation at rotator cuff - YES  Rotator cuff testing Val's test - positive  Rotator cuff testing External rotation - no  Rotator cuff testing Lag signs - no  Rotator cuff testing Belly press - no  Pain with abduction great than 90 degrees - yes    Scapular dyskinesis - present, abnormal scapular motion    Long head of the biceps testing:  Rebolledo's test for biceps & Speed's test - painful  Bicipital groove tenderness to palpation/tenderness to palpation of biceps tendon - painful    +scapular protraction  Impingement        Results Review:   Imaging Results (Last 24 Hours)       Procedure Component Value Units Date/Time    XR Shoulder 2+ View Bilateral [[id number]] Resulted: 03/21/24 1612     Updated: 03/21/24 1613    Narrative:      Imaging: BILATERAL shoulder x-rays 2 views - AP and axillary x-ray views    Side: BILATERAL     Indication for BILATERAL  shoulder x-ray 2 views: BILATERAL shoulder pain    Comparison: no comparison views available    BILATERAL SHOULDER Findings: No acute bony pathology. No superior humeral   head migration.  The humeral head remains centered in the glenohumeral   joint. No evidence of calcific tendonitis. Mild degenerative AC joint "   changes.    I personally reviewed the above x-rays.            Procedures             Assessment / Plan      Assessment/Plan:   Problem List Items Addressed This Visit          Musculoskeletal and Injuries    Injury of right rotator cuff    Relevant Orders    MRI Shoulder Right Without Contrast    Right shoulder pain - Primary    Relevant Orders    XR Shoulder 2+ View Bilateral (Completed)    MRI Shoulder Right Without Contrast    Biceps tendinitis of right upper extremity    Relevant Orders    MRI Shoulder Right Without Contrast    Bursitis of right shoulder    Relevant Orders    MRI Shoulder Right Without Contrast    Impingement syndrome of right shoulder    Relevant Orders    MRI Shoulder Right Without Contrast    Impingement syndrome of left shoulder    Relevant Orders    MRI Shoulder Right Without Contrast       RIGHT SHOULDER  MRI of the shoulder is recommended.  Indication: suspected rotator cuff tear  The MRI is critical to evaluate for rotator cuff tearing and will help with possible surgical planning.    Left shoulder -- impingement    He will continue with HEP, completed PT    Follow Up: AFTER RIGHT SHOULDER MRI        Jayro Williamson MD, FAAOS  Orthopedic Surgeon  Fellowship Trained Shoulder and Elbow Surgeon  [facility]  Orthopedics and Sports Medicine  [address] [place] [address], [address]  [place] [address]    03/21/24  16:34 EDT

## 2024-03-27 ENCOUNTER — TELEPHONE (OUTPATIENT)
Dept: ORTHOPEDIC SURGERY | Facility: CLINIC | Age: 49
End: 2024-03-27
Payer: COMMERCIAL

## 2024-03-27 NOTE — TELEPHONE ENCOUNTER
Caller: John Kay    Relationship: Self    Best call back number: [phone]    What orders are you requesting (i.e. lab or imaging): MRI RIGHT SHOULDER     In what timeframe would the patient need to come in: ASAP     Where will you receive your lab/imaging services: [facility]     Additional notes: F#[phone]

## 2024-03-28 NOTE — TELEPHONE ENCOUNTER
*HUB OK TO READ*    RECEIVED AUTHORIZATION FROM JAMES. FAXED ORDER AND AUTH TO [facility]. THEY WILL CALL THE PATIENT TO SCHEDULE.

## 2024-04-10 DIAGNOSIS — S46.001A INJURY OF RIGHT ROTATOR CUFF, INITIAL ENCOUNTER: ICD-10-CM

## 2024-04-10 DIAGNOSIS — M25.511 RIGHT SHOULDER PAIN, UNSPECIFIED CHRONICITY: ICD-10-CM

## 2024-04-10 DIAGNOSIS — M75.42 IMPINGEMENT SYNDROME OF LEFT SHOULDER: ICD-10-CM

## 2024-04-10 DIAGNOSIS — M75.41 IMPINGEMENT SYNDROME OF RIGHT SHOULDER: ICD-10-CM

## 2024-04-10 DIAGNOSIS — M75.51 BURSITIS OF RIGHT SHOULDER: ICD-10-CM

## 2024-04-10 DIAGNOSIS — M75.21 BICEPS TENDINITIS OF RIGHT UPPER EXTREMITY: ICD-10-CM

## 2024-04-18 ENCOUNTER — OFFICE VISIT (OUTPATIENT)
Dept: ORTHOPEDIC SURGERY | Facility: CLINIC | Age: 49
End: 2024-04-18
Payer: COMMERCIAL

## 2024-04-18 VITALS
SYSTOLIC BLOOD PRESSURE: 126 MMHG | BODY MASS INDEX: 26.1 KG/M2 | WEIGHT: 182.32 LBS | DIASTOLIC BLOOD PRESSURE: 78 MMHG | HEIGHT: 70 IN

## 2024-04-18 DIAGNOSIS — S46.001A INJURY OF RIGHT ROTATOR CUFF, INITIAL ENCOUNTER: ICD-10-CM

## 2024-04-18 DIAGNOSIS — M75.51 BURSITIS OF RIGHT SHOULDER: ICD-10-CM

## 2024-04-18 DIAGNOSIS — M75.121 NONTRAUMATIC COMPLETE TEAR OF RIGHT ROTATOR CUFF: Primary | ICD-10-CM

## 2024-04-18 DIAGNOSIS — M75.41 IMPINGEMENT SYNDROME OF RIGHT SHOULDER: ICD-10-CM

## 2024-04-18 DIAGNOSIS — M75.21 BICEPS TENDINITIS OF RIGHT UPPER EXTREMITY: ICD-10-CM

## 2024-04-18 NOTE — PROGRESS NOTES
"                                                                [facility] [place] [facility] [place] Up       Office Follow Up Visit       Patient Name: John Kay    Chief Complaint:   Chief Complaint   Patient presents with   • Follow-up     4 week (MRI) follow-up: Right shoulder pain       Referring Physician: No ref. provider found    History of Present Illness:   It has been 4  week(s) since John Kay's last visit. John Kay returns to clinic today for F/U: follow-up of rightBody Part: shoulderReason: pain. The issue has been ongoing for 6 month(s). John Kay rates HIS/HER: hispain at 5/10 on the pain scale. Previous/current treatments: NSAIDS and physical therapy. Current symptoms:Symptoms: same as prior visit. The pain is worse with working and lying on affected side; resting, sitting, ice, heat, and pain medication and/or NSAID improves the pain. Overall, he/she: heis doing the same. I have reviewed the patient's history of present illness as noted/entered above.    I have reviewed the patient's past medical history, surgical history, social history, family history, medications, and allergies as noted in the electronic medical record and as noted/entered.  I have reviewed the patient's review of systems as noted/enter and updated as noted in the patient's HPI.      RIGHT shoulder and left shoulder  Has enjoyed CrossFit for some time, 859  He is felt that most of the issues have been with his biceps he has been concerned about possible biceps popping.  Right worse than left shoulder pain.       He did have an injury in the yard pulling injury felt a snap and jerk.    Hanging x15 minutes; the hanging program has been helpful he notes.  \"Terence-eeartem\"  Stays active, fit        48 y.o. male  Body mass index is 25.82 kg/m².    4/18/2024:  RIGHT SHOULDER  OSH MRI reviewed  Full-thickness tearing noted of the right shoulder.  He had tried some physical therapy before but is interested in new team in [facility] " Rx provided  Upcoming trip out of the country in May for 2 weeks  Persistent difficulty with overhead activities    Subjective   Subjective      Review of Systems   Constitutional: Negative.  Negative for chills, fatigue and fever.   HENT: Negative.  Negative for congestion and dental problem.    Eyes: Negative.  Negative for blurred vision.   Respiratory: Negative.  Negative for shortness of breath.    Cardiovascular: Negative.  Negative for leg swelling.   Gastrointestinal: Negative.  Negative for abdominal pain.   Endocrine: Negative.  Negative for polyuria.   Genitourinary: Negative.  Negative for difficulty urinating.   Musculoskeletal:  Positive for arthralgias.   Skin: Negative.    Allergic/Immunologic: Negative.    Neurological: Negative.    Hematological: Negative.  Negative for adenopathy.   Psychiatric/Behavioral: Negative.  Negative for behavioral problems.         Past Medical History:   Past Medical History:   Diagnosis Date   • Acute pancreatitis    • Allergic    • Diabetes mellitus    • Tear of meniscus of knee 1996   • Tennis elbow 2015       Past Surgical History:   Past Surgical History:   Procedure Laterality Date   • ERCP WITH SPHINCTEROTOMY/PAPILLOTOMY      Early 2000's   • KNEE ACL RECONSTRUCTION Left    • PROSTATE SURGERY  04/05/2024   • UPPER GASTROINTESTINAL ENDOSCOPY         Family History:   Family History   Problem Relation Age of Onset   • Hearing loss Father    • Hyperlipidemia Father        Social History:   Social History     Socioeconomic History   • Marital status:    Tobacco Use   • Smoking status: Never     Passive exposure: Never   • Smokeless tobacco: Never   Vaping Use   • Vaping status: Never Used   Substance and Sexual Activity   • Alcohol use: Not Currently     Alcohol/week: 1.0 standard drink of alcohol     Comment: occasionally   • Drug use: Never   • Sexual activity: Yes     Partners: Female       Medications: No current outpatient medications on  "file.    Allergies:   Allergies   Allergen Reactions   • Shellfish Allergy Swelling       The following portions of the patient's history were reviewed and updated as appropriate: allergies, current medications, past family history, past medical history, past social history, past surgical history and problem list.        Objective    Objective      Vital Signs:   Vitals:    04/18/24 1437   BP: 126/78   Weight: 82.7 kg (182 lb 5.1 oz)   Height: 179 cm (70.47\")       Ortho Exam:  RIGHT SHOULDER  Patient remains stoic he does have pain with Jobes testing painful arc of motion positive Neer positive Bustamante    Results Review:  Imaging Results (Last 24 Hours)       ** No results found for the last 24 hours. **                            I personally reviewed and personally interpreted the imaging above.  Full-thickness supraspinatus tearing with possible extension into the anterior portion of the infraspinatus  MRI was at [facility] 4/9/2024 perhaps partial tearing of the upper border the subscapularis.    Procedures            Assessment / Plan      Assessment/Plan:   Problem List Items Addressed This Visit          Musculoskeletal and Injuries    Injury of right rotator cuff    Relevant Orders    Ambulatory Referral to Physical Therapy Evaluate and treat, Ortho (Completed)    Biceps tendinitis of right upper extremity    Relevant Orders    Ambulatory Referral to Physical Therapy Evaluate and treat, Ortho (Completed)    Bursitis of right shoulder    Relevant Orders    Ambulatory Referral to Physical Therapy Evaluate and treat, Ortho (Completed)    Impingement syndrome of right shoulder    Relevant Orders    Ambulatory Referral to Physical Therapy Evaluate and treat, Ortho (Completed)    Nontraumatic complete tear of right rotator cuff - Primary    Relevant Orders    Ambulatory Referral to Physical Therapy Evaluate and treat, Ortho (Completed)       RIGHT SHOULDER  Counseled on operative versus nonoperative " measures.  His injury was in October he does plan on being out of the country for 2 weeks in May.  He would like to reassess following additional physical therapy.  Counseled on continued physical therapy versus surgery at this time.  He understands about possible tear progression over time and potential limitations with a full-thickness rotator cuff tear we will reassess when he returns from his trip.    PT Rx      Follow Up: 6-8 weeks      Jayro Williamson MD, FAAOS  Orthopedic Surgeon  Fellowship Trained Shoulder and Elbow Surgeon  [facility]  Orthopedics and Sports Medicine  [address] [place] [address], [address]  [place] [address]    04/18/24  21:55 EDT

## 2024-04-22 ENCOUNTER — OFFICE VISIT (OUTPATIENT)
Dept: INTERNAL MEDICINE | Facility: CLINIC | Age: 49
End: 2024-04-22
Payer: COMMERCIAL

## 2024-04-22 VITALS
RESPIRATION RATE: 20 BRPM | DIASTOLIC BLOOD PRESSURE: 80 MMHG | BODY MASS INDEX: 25.5 KG/M2 | HEART RATE: 72 BPM | WEIGHT: 180.13 LBS | TEMPERATURE: 97.8 F | SYSTOLIC BLOOD PRESSURE: 128 MMHG

## 2024-04-22 DIAGNOSIS — N40.1 BENIGN PROSTATIC HYPERPLASIA WITH INCOMPLETE BLADDER EMPTYING: ICD-10-CM

## 2024-04-22 DIAGNOSIS — S05.02XA ABRASION OF LEFT CORNEA, INITIAL ENCOUNTER: ICD-10-CM

## 2024-04-22 DIAGNOSIS — E78.2 MIXED HYPERLIPIDEMIA: ICD-10-CM

## 2024-04-22 DIAGNOSIS — M75.121 NONTRAUMATIC COMPLETE TEAR OF RIGHT ROTATOR CUFF: Primary | ICD-10-CM

## 2024-04-22 DIAGNOSIS — J30.1 SEASONAL ALLERGIC RHINITIS DUE TO POLLEN: ICD-10-CM

## 2024-04-22 DIAGNOSIS — R39.14 BENIGN PROSTATIC HYPERPLASIA WITH INCOMPLETE BLADDER EMPTYING: ICD-10-CM

## 2024-04-22 PROCEDURE — 99214 OFFICE O/P EST MOD 30 MIN: CPT | Performed by: STUDENT IN AN ORGANIZED HEALTH CARE EDUCATION/TRAINING PROGRAM

## 2024-04-22 RX ORDER — ERYTHROMYCIN 5 MG/G
OINTMENT OPHTHALMIC NIGHTLY
Qty: 3.5 G | Refills: 0 | Status: SHIPPED | OUTPATIENT
Start: 2024-04-22 | End: 2024-04-29

## 2024-04-22 NOTE — PROGRESS NOTES
Follow Up Office Visit      Date: 2024   Patient Name: John Kay  : 1975   MRN: [id number]     Chief Complaint:    Chief Complaint   Patient presents with    Follow-up       History of Present Illness: John Kay is a 48 y.o. male who is here today to follow up with chronic care management.    History of Present Illness  The patient presents for evaluation of multiple medical concerns.    The patient recently consulted with Dr. Williamson for a 2 cm tear in his left shoulder. He is contemplating physical therapy in 2024, contingent upon his departure from country. His shoulder pain is intermittent, often disrupting his sleep and a sensation akin to being completely broken. Currently, his shoulder feels unaffected, and he has been limiting his activities. Dr. Williamson has suggested physical therapy as a treatment option. The patient has refrained from gym activities due to fear of damage, but plans to resume CrossFit.    The patient underwent a prostate procedure in [place], specifically the Optavia procedure. He reports complete bladder emptying and no symptoms. The procedure was approved by the FDA last year, and he has since discontinued this treatment. He is scheduled for a follow-up procedure in 10 years.    On , the patient awoke with redness in his left eye. He denies any trauma to the eye but reports feeling heavy. Despite these symptoms, he is able to work on his computer and is uncertain about any changes in his vision. He also reports sinus issues, which he attributes to weather changes. He denies any pus discharge. He typically experiences pressure in his head and behind his eye. He takes over-the-counter Claritin for his allergy symptoms. He has intranasal Flonase but has not used it in the past week. He does not believe his symptoms are allergy-related. He has Pataday and Zaditor drops at home. He reports a sensation of an eyelash bothering him on Saturday. He does not use contact  lenses.    The patient's diet was poor over the Christmas period, which he believes was due to fasting. His last blood test was conducted in 01/2024 or 02/2024. He consumes a significant amount of olive oil and avocados.      Subjective      Review of Systems:   Review of Systems   All other systems reviewed and are negative.      I have reviewed the patients family history, social history, past medical history, past surgical history and have updated it as appropriate.     Medications:     Current Outpatient Medications:     erythromycin (ROMYCIN) 5 MG/GM ophthalmic ointment, Administer  into the left eye Every Night for 7 days., Disp: 3.5 g, Rfl: 0    Allergies:   Allergies   Allergen Reactions    Shellfish Allergy Swelling       Objective     Physical Exam: Please see above  Vital Signs:   Vitals:    04/22/24 1258   BP: 128/80   BP Location: Right arm   Patient Position: Sitting   Cuff Size: Adult   Pulse: 72   Resp: 20   Temp: 97.8 °F (36.6 °C)   TempSrc: Temporal   Weight: 81.7 kg (180 lb 2 oz)   PainSc:   2   PainLoc: Shoulder     Body mass index is 25.5 kg/m².      The 10-year ASCVD risk score (Frederic BOSS, et al., 2019) is: 5.4%    Values used to calculate the score:      Age: 48 years      Sex: Male      Is Non- : No      Diabetic: No      Tobacco smoker: No      Systolic Blood Pressure: 128 mmHg      Is BP treated: No      HDL Cholesterol: 37 mg/dL      Total Cholesterol: 232 mg/dL      Physical Exam  Vitals and nursing note reviewed.   Constitutional:       General: He is not in acute distress.     Appearance: Normal appearance. He is normal weight. He is not ill-appearing or toxic-appearing.   HENT:      Nose: No congestion or rhinorrhea.   Eyes:      General:         Right eye: No foreign body or discharge.         Left eye: No foreign body, discharge or hordeolum.      Extraocular Movements:      Right eye: Normal extraocular motion and no nystagmus.      Left eye: Normal  "extraocular motion and no nystagmus.      Conjunctiva/sclera:      Right eye: Right conjunctiva is not injected. No exudate.     Left eye: Left conjunctiva is injected. No exudate.    Pulmonary:      Effort: Pulmonary effort is normal. No respiratory distress.   Abdominal:      General: Abdomen is flat. There is no distension.   Musculoskeletal:      Cervical back: Normal range of motion.   Skin:     Coloration: Skin is not jaundiced.      Findings: No rash.   Neurological:      General: No focal deficit present.      Mental Status: He is alert. Mental status is at baseline.      Coordination: Coordination normal.      Gait: Gait normal.   Psychiatric:         Mood and Affect: Mood normal.         Behavior: Behavior normal.         Thought Content: Thought content normal.         Judgment: Judgment normal.         Procedures    Results:   Results      Labs:   No results found for: \"HGBA1C\", \"CMP\", \"CBCDIFFPANEL\", \"CREAT\", \"TSH\"     Imaging:   No valid procedures specified.     Assessment / Plan      Assessment/Plan:   Problem List Items Addressed This Visit       Benign prostatic hyperplasia with incomplete bladder emptying    Mixed hyperlipidemia    Relevant Orders    Lipid Panel    Nontraumatic complete tear of right rotator cuff - Primary    Seasonal allergic rhinitis due to pollen     Other Visit Diagnoses       Abrasion of left cornea, initial encounter        Relevant Medications    erythromycin (ROMYCIN) 5 MG/GM ophthalmic ointment            Assessment & Plan  1. Rotator cuff tear.  Continue to follow with orthopedic surgery for consideration of procedure following physical therapy.    2. Prostatic complications.  The patient's condition is stable.    3. Left eye redness.  There is a possibility of a vessel rupture on the medial portion of the patient's left eye. The use of intranasal Flonase and Pataday or Zaditor drops is recommended. Erythromycin ointment has been prescribed for a duration of 7 days. " Should the patient experience significant increased pressure, visual changes, or pain, he is advised to seek immediate follow-up with an ophthalmologist.    4. Hypercholesterolemia.  The patient's 10-year cardiovascular risk is calculated at 5.4 percent. A lipid panel will be re-evaluated in 2 months. Dietary modifications will be implemented. If the patient's condition worsens, treatment will be considered.    Follow-up  The patient is scheduled for a follow-up visit in 02/2025, or sooner if necessary.    Follow Up:   Return in about 42 weeks (around 2/10/2025) for Annual.        Patient or patient representative verbalized consent for the use of Ambient Listening during the visit with  Christopher Burrows MD for chart documentation. 4/22/2024  13:31 EDT    Christopher Burrows MD  [facility] [place] [facility]

## 2024-05-06 ENCOUNTER — TELEPHONE (OUTPATIENT)
Age: 49
End: 2024-05-06

## 2024-05-06 DIAGNOSIS — M75.41 IMPINGEMENT SYNDROME OF RIGHT SHOULDER: ICD-10-CM

## 2024-05-06 DIAGNOSIS — M75.51 BURSITIS OF RIGHT SHOULDER: ICD-10-CM

## 2024-05-06 DIAGNOSIS — M75.121 NONTRAUMATIC COMPLETE TEAR OF RIGHT ROTATOR CUFF: Primary | ICD-10-CM

## 2024-05-06 DIAGNOSIS — M25.511 RIGHT SHOULDER PAIN, UNSPECIFIED CHRONICITY: ICD-10-CM

## 2024-05-06 DIAGNOSIS — M75.42 IMPINGEMENT SYNDROME OF LEFT SHOULDER: ICD-10-CM

## 2024-05-06 DIAGNOSIS — M75.21 BICEPS TENDINITIS OF RIGHT UPPER EXTREMITY: ICD-10-CM

## 2024-05-06 DIAGNOSIS — S46.001A INJURY OF RIGHT ROTATOR CUFF, INITIAL ENCOUNTER: ICD-10-CM

## 2024-05-06 NOTE — TELEPHONE ENCOUNTER
Per your note he was supposed to come back in to see you. Do you want him to return first or can I set him up for surgery? I would need the case. Thanks!

## 2024-05-06 NOTE — TELEPHONE ENCOUNTER
Provider: DR DORADO    Caller: TRACY ALEXANDER     Relationship to Patient: PATIENT     Phone Number: [phone]    Reason for Call: RIGHT SHOULDER SX    When was the patient last seen: 03/21/2023      PATIENT STATES HE WILL BE OUT OF THE COUNTRY MAY 11 - MAY 25,2024    BECAUSE OF HIS WORK SCHEDULE HE WOULD LIKE TO BE ADDED TO DR DORADO' SX SCHEDULE JUNE 24, 2024 FOR HIS SX.

## 2024-06-24 ENCOUNTER — OUTSIDE FACILITY SERVICE (OUTPATIENT)
Dept: ORTHOPEDIC SURGERY | Facility: CLINIC | Age: 49
End: 2024-06-24
Payer: COMMERCIAL

## 2024-06-24 ENCOUNTER — DOCUMENTATION (OUTPATIENT)
Dept: ORTHOPEDIC SURGERY | Facility: CLINIC | Age: 49
End: 2024-06-24

## 2024-06-24 DIAGNOSIS — M75.121 NONTRAUMATIC COMPLETE TEAR OF RIGHT ROTATOR CUFF: ICD-10-CM

## 2024-06-24 DIAGNOSIS — S46.001A INJURY OF RIGHT ROTATOR CUFF, INITIAL ENCOUNTER: ICD-10-CM

## 2024-06-24 DIAGNOSIS — Z98.890 STATUS POST RIGHT ROTATOR CUFF REPAIR: Primary | ICD-10-CM

## 2024-06-24 DIAGNOSIS — M75.42 IMPINGEMENT SYNDROME OF LEFT SHOULDER: ICD-10-CM

## 2024-06-24 DIAGNOSIS — M75.51 BURSITIS OF RIGHT SHOULDER: ICD-10-CM

## 2024-06-24 DIAGNOSIS — M75.41 IMPINGEMENT SYNDROME OF RIGHT SHOULDER: ICD-10-CM

## 2024-06-24 DIAGNOSIS — M75.21 BICEPS TENDINITIS OF RIGHT UPPER EXTREMITY: ICD-10-CM

## 2024-06-24 DIAGNOSIS — M25.511 RIGHT SHOULDER PAIN, UNSPECIFIED CHRONICITY: ICD-10-CM

## 2024-06-24 PROCEDURE — 29826 SHO ARTHRS SRG DECOMPRESSION: CPT | Performed by: ORTHOPAEDIC SURGERY

## 2024-06-24 PROCEDURE — 29827 SHO ARTHRS SRG RT8TR CUF RPR: CPT | Performed by: ORTHOPAEDIC SURGERY

## 2024-06-24 RX ORDER — ONDANSETRON 4 MG/1
4 TABLET, FILM COATED ORAL EVERY 6 HOURS PRN
Qty: 30 TABLET | Refills: 1 | Status: SHIPPED | OUTPATIENT
Start: 2024-06-24 | End: 2024-07-09

## 2024-06-24 RX ORDER — HYDROCODONE BITARTRATE AND ACETAMINOPHEN 10; 325 MG/1; MG/1
1 TABLET ORAL EVERY 6 HOURS PRN
Qty: 28 TABLET | Refills: 0 | Status: SHIPPED | OUTPATIENT
Start: 2024-06-24 | End: 2024-07-01

## 2024-06-24 RX ORDER — METHOCARBAMOL 500 MG/1
500 TABLET, FILM COATED ORAL 3 TIMES DAILY PRN
Qty: 42 TABLET | Refills: 0 | Status: SHIPPED | OUTPATIENT
Start: 2024-06-24 | End: 2024-07-08

## 2024-07-01 ENCOUNTER — TELEPHONE (OUTPATIENT)
Dept: ORTHOPEDIC SURGERY | Facility: CLINIC | Age: 49
End: 2024-07-01

## 2024-07-01 ENCOUNTER — TELEPHONE (OUTPATIENT)
Dept: ORTHOPEDIC SURGERY | Facility: CLINIC | Age: 49
End: 2024-07-01
Payer: COMMERCIAL

## 2024-07-01 NOTE — TELEPHONE ENCOUNTER
Caller: PATIENT    Relationship to patient: SELF     Best call back number: [phone]    Chief complaint: RIGHT SHOULDER    Type of visit: POST OP    Requested date: PATIENT IS LOOKING FOR AN APPT IN THE AFTERNOON ON 07/9/24 OR ANOTHER DAY.    If rescheduling, when is the original appointment:  07/9/24 AT 8:00 AM    Additional notes: PATIENT WAS CALLING TO RESCHEDULE HIS FIRST POST OP APPT DUE TO HIS WIFE HAVING AN APPT ON 07/9/24 AT THE SAME TIME. PATIENT WOULD LIKE A CALL BACK TO DISCUSS CHANGING HIS APPT TO ANOTHER TIME ON 07/09/24 OR ANOTHER DAY. THANK YOU!

## 2024-07-01 NOTE — TELEPHONE ENCOUNTER
Phone call to pt.  He has been taking the sling off to rest his arm.  I advised that he should be in the sling the entire day unless he is showering.  He needs to keep the pillow in the sling until he comes for his post op appointment.  He demonstrated understanding and appreciation.

## 2024-07-01 NOTE — TELEPHONE ENCOUNTER
Hub staff attempted to follow warm transfer process and was unsuccessful     Caller: PATIENT    Relationship to patient: SELF     Best call back number: [phone]    Patient is needing: PATIENT WAS CALLING TO ASK A CLINICAL QUESTION THAT HE HAS. PATIENT STATED HIS SLING HAS AN ABDUCTION PILLOW AND HE WANTED TO KNOW IF HE COULD TAKE OFF THE PILLOW WHILE HE SLEEPS. PATIENT WOULD LIKE A CALL BACK TO DISCUSS THIS QUESTION. I SPOKE WITH PAULA AND WAS ADVISED TO SEND A MESSAGE TO THE CLINICAL STAFF. THANK YOU!

## 2024-07-08 ENCOUNTER — TELEPHONE (OUTPATIENT)
Dept: ORTHOPEDIC SURGERY | Facility: CLINIC | Age: 49
End: 2024-07-08
Payer: COMMERCIAL

## 2024-07-08 NOTE — TELEPHONE ENCOUNTER
Regi with [facility] is calling requesting a referral for status post right rotator cuff therapy.     Phone [phone]    Fax [phone]

## 2024-07-09 ENCOUNTER — TELEPHONE (OUTPATIENT)
Dept: ORTHOPEDIC SURGERY | Facility: CLINIC | Age: 49
End: 2024-07-09

## 2024-07-09 DIAGNOSIS — Z98.890 STATUS POST RIGHT ROTATOR CUFF REPAIR: Primary | ICD-10-CM

## 2024-07-09 NOTE — TELEPHONE ENCOUNTER
Caller: [facility]    Relationship to patient: PROVIDER     Patient is needing: FAX OP REPORT AND PROTOCOL TO [facility] -[phone]

## 2024-07-12 ENCOUNTER — OFFICE VISIT (OUTPATIENT)
Dept: ORTHOPEDIC SURGERY | Facility: CLINIC | Age: 49
End: 2024-07-12
Payer: COMMERCIAL

## 2024-07-12 VITALS — TEMPERATURE: 96.9 F

## 2024-07-12 DIAGNOSIS — Z98.890 STATUS POST RIGHT ROTATOR CUFF REPAIR: Primary | ICD-10-CM

## 2024-07-12 NOTE — PROGRESS NOTES
[facility] [place] [facility] Follow Up       Office Follow Up Visit       Patient Name: John Kay    Chief Complaint:   Chief Complaint   Patient presents with    Post-op     2.5 weeks s/p Right shoulder RCR with subacromial decompression with acromioplasty 6/24/24       Referring Physician: No ref. provider found    History of Present Illness:   John Kay returns to clinic today for 2-week postop visit s/p right arthroscopic rotator cuff repair, subacromial decompression with Dr. Williamson.  Here today with supportive wife.  He reports to be doing well.  Pain has been controlled.  Some trouble sleeping as expected.  He has seen Herberth Vee at [facility] twice this week.  Overall doing well.    Date of Surgery: 6/24/2024  Shoulder: RIGHT shoulder   Preoperative Diagnosis:  1.     Rotator cuff tear chronic full-thickness rotator cuff tearing supraspinatus- treated with arthroscopic rotator cuff repair - CPT 15915  2.     Shoulder impingement syndrome with significant CA ligament tearing- treated with arthroscopic subacromial decompression with acromioplasty - CPT 32677     Postoperative Diagnosis:  1.     SAME as preoperative diagnoses; long head of the biceps intact, small SLAP tear, no indication for biceps tenodesis     Procedure:  1.     Arthroscopic rotator cuff repair (1x2) - CPT 49757  2.     Arthroscopic subacromial decompression with acromioplasty - CPT 50529    Subjective     Review of Systems   Constitutional: Negative.  Negative for chills, fatigue and fever.   HENT: Negative.  Negative for congestion and dental problem.    Eyes: Negative.  Negative for blurred vision.   Respiratory: Negative.  Negative for shortness of breath.    Cardiovascular: Negative.  Negative for leg swelling.   Gastrointestinal: Negative.  Negative for abdominal pain.   Endocrine: Negative.  Negative for polyuria.   Genitourinary: Negative.  Negative for  difficulty urinating.   Musculoskeletal:  Positive for arthralgias.   Skin: Negative.    Allergic/Immunologic: Negative.    Neurological: Negative.    Hematological: Negative.  Negative for adenopathy.   Psychiatric/Behavioral: Negative.  Negative for behavioral problems.         I have reviewed and updated the following portions of the patient's history and review of systems: allergies, current medications, past family history, past medical history, past social history, past surgical history and problem list.    Medications: No current outpatient medications on file.    Allergies:   Allergies   Allergen Reactions    Shellfish Allergy Swelling         Objective      Vital Signs:   Vitals:    07/12/24 0822   Temp: 96.9 °F (36.1 °C)       Ortho Exam:  General: comfortable  Vascular: 2+ radial pulse  Neurologic: sensation to light touch is intact distally, elbow flexion/elbow extension/wrist flexion/wrist extension/hand intrinsics intact, able to fire deltoid; no residual defects noted from the block  Dermatologic: surgical incisions are well appearing, with no drainage or surrounding erythema; no signs or symptoms of infection or DVT      Results Review:  XR Shoulder 2+ View Right  Imaging: shoulder x-rays 1 view - AP x-ray views    Side: RIGHT SHOULDER    Indication for shoulder x-ray 1 view: shoulder pain, postop evaluation   following surgery    Comparison: the current xray was compared to preoperative imaging and   indicates expected postoperative changes.    Findings: No acute bony pathology. Well appearing and well positioned   compared to preoperative imaging.  Located and no fracture noted.    I personally reviewed the above x-rays.         Assessment / Plan      Assessment:   Diagnoses and all orders for this visit:    1. Status post right rotator cuff repair (Primary)  -     XR Shoulder 2+ View Right        Quality Metrics:   BMI:   BMI is >= 25 and <30. (Overweight) The following options were offered after  discussion;: weight loss educational material (shared in after visit summary)       Tobacco:   John Kay  reports that he has never smoked. He has never been exposed to tobacco smoke. He has never used smokeless tobacco.    Plan:  2 weeks s/p right rotator cuff repair with Dr. Williamson.  We reviewed intraoperative photographs together today.  He will continue sling use for the next week.  May take breaks as needed.  Recommend no lifting more than 2 pounds on the right side.  Continue physical therapy at [facility] with Herberth Vee.  Protocol provided today.  Sutures out.  May take over-the-counter anti-inflammatories as needed for pain and swelling.      Follow Up:   2 months    Lizet Dey PA-C  [facility]    Dictated using Dragon Speech Recognition.

## 2024-09-10 ENCOUNTER — OFFICE VISIT (OUTPATIENT)
Age: 49
End: 2024-09-10
Payer: COMMERCIAL

## 2024-09-10 DIAGNOSIS — Z98.890 STATUS POST RIGHT ROTATOR CUFF REPAIR: Primary | ICD-10-CM

## 2024-09-10 PROCEDURE — 99024 POSTOP FOLLOW-UP VISIT: CPT

## 2024-09-10 NOTE — PROGRESS NOTES
[facility] [place] Up       Office Follow Up Visit       Patient Name: John Kay    Chief Complaint:   Chief Complaint   Patient presents with    Post-op     2 month follow up- 2.5 months s/p Right shoulder RCR with subacromial decompression with acromioplasty 6/24/24       Referring Physician: Christopher Burrows MD    History of Present Illness:   John Kay returns to clinic today for postop visit 2.5 months s/p right shoulder rotator cuff repair with Dr. Williamson.  Last visit on 7/12/2024.  He has been in physical therapy with Herberth Vee since last visit.  He has seen improvements in regards to right shoulder motion and pain. Not yet started strengthening.    Date of Surgery: 6/24/2024  Shoulder: RIGHT shoulder   Preoperative Diagnosis:  1.     Rotator cuff tear chronic full-thickness rotator cuff tearing supraspinatus- treated with arthroscopic rotator cuff repair - CPT 24299  2.     Shoulder impingement syndrome with significant CA ligament tearing- treated with arthroscopic subacromial decompression with acromioplasty - CPT 52307     Postoperative Diagnosis:  1.     SAME as preoperative diagnoses; long head of the biceps intact, small SLAP tear, no indication for biceps tenodesis     Procedure:  1.     Arthroscopic rotator cuff repair (1x2) - CPT 91366  2.     Arthroscopic subacromial decompression with acromioplasty - CPT 41127    Subjective     Review of Systems   Constitutional: Negative.  Negative for chills, fatigue and fever.   HENT: Negative.  Negative for congestion and dental problem.    Eyes: Negative.  Negative for blurred vision.   Respiratory: Negative.  Negative for shortness of breath.    Cardiovascular: Negative.  Negative for leg swelling.   Gastrointestinal: Negative.  Negative for abdominal pain.   Endocrine: Negative.  Negative for polyuria.   Genitourinary: Negative.  Negative for difficulty urinating.    Musculoskeletal:  Positive for arthralgias.   Skin: Negative.    Allergic/Immunologic: Negative.    Neurological: Negative.    Hematological: Negative.  Negative for adenopathy.   Psychiatric/Behavioral: Negative.  Negative for behavioral problems.         I have reviewed and updated the following portions of the patient's history and review of systems: allergies, current medications, past family history, past medical history, past social history, past surgical history and problem list.    Medications: No current outpatient medications on file.    Allergies:   Allergies   Allergen Reactions    Shellfish Allergy Swelling         Objective      Vital Signs: There were no vitals filed for this visit.    Ortho Exam:  General: no acute distress, comfortable  Vitals reviewed in chart    Musculoskeletal Exam:    SIDE: Right shoulder  Incisions well healed    Right shoulder range of motion reveals satisfactory passive and active range. He can forward flex to 130 degrees without difficulty. Negative lag sign with rotator cuff testing.  No evidence of septic joint      Results Review:  XR Shoulder 2+ View Right  Imaging: shoulder x-rays 1 view - AP x-ray views    Side: RIGHT SHOULDER    Indication for shoulder x-ray 1 view: shoulder pain, postop evaluation   following surgery    Comparison: the current xray was compared to preoperative imaging and   indicates expected postoperative changes.    Findings: No acute bony pathology. Well appearing and well positioned   compared to preoperative imaging.  Located and no fracture noted.    I personally reviewed the above x-rays.       I have noted results reviewed from previous encounter.        Assessment / Plan      Assessment:   Diagnoses and all orders for this visit:    1. Status post right rotator cuff repair (Primary)  -     Ambulatory Referral to Physical Therapy for Evaluation & Treatment        Quality Metrics:   BMI:   BMI is >= 25 and <30. (Overweight) The following  options were offered after discussion;: weight loss educational material (shared in after visit summary)       Tobacco:   John Kay  reports that he has never smoked. He has never been exposed to tobacco smoke. He has never used smokeless tobacco.        Plan:  Patient 2.5 months s/p right shoulder rotator cuff repair making progress with passive and active range of motion. Encouraged to continue with PT for mobilization and gradually strengthening at 3 months. New order placed today. We will reassess in 2 months.  He would like his left shoulder looked at next visit.      Follow Up:   6 weeks with x-rays 2+ views left shoulder     Lizet Dey PA-C  [facility]    Dictated using Dragon Speech Recognition.

## 2024-10-15 ENCOUNTER — OFFICE VISIT (OUTPATIENT)
Age: 49
End: 2024-10-15
Payer: COMMERCIAL

## 2024-10-15 VITALS
WEIGHT: 181.6 LBS | HEIGHT: 70 IN | BODY MASS INDEX: 26 KG/M2 | DIASTOLIC BLOOD PRESSURE: 72 MMHG | SYSTOLIC BLOOD PRESSURE: 110 MMHG

## 2024-10-15 DIAGNOSIS — Z98.890 STATUS POST RIGHT ROTATOR CUFF REPAIR: ICD-10-CM

## 2024-10-15 DIAGNOSIS — S46.002A INJURY OF LEFT ROTATOR CUFF, INITIAL ENCOUNTER: Primary | ICD-10-CM

## 2024-10-15 DIAGNOSIS — M25.512 ACUTE PAIN OF LEFT SHOULDER: ICD-10-CM

## 2024-10-15 NOTE — PROGRESS NOTES
[facility] [place] [facility] Follow Up       Office Follow Up Visit       Patient Name: John Kay    Chief Complaint:   Chief Complaint   Patient presents with    Left Shoulder - Pain    Right Shoulder - Follow-up     5 week follow-up-- 3.5 months s/p Right shoulder RCR with subacromial decompression with acromioplasty 6/24/24       Referring Physician: No ref. provider found    History of Present Illness:   John Kay returns to clinic today for new problem of left shoulder pain.  Ongoing for the last several months.  Denies injury or trauma.  Pain initially started with crossfit. No longer in crossfit. He had right shoulder rotator cuff repair 4 months ago.  Since then he has been in physical therapy and has noticed increased left shoulder pain with certain activity.  Mostly localized to the anterior lateral shoulder. Treatments tried include activity modification and PT.    Right shoulder rotator cuff repair 6/20/2024.  Doing well in regards to right shoulder mobility.  He has started strengthening recently and seeing improvements.  He goes to [facility] in [place].    Date of Surgery: 6/24/2024  Shoulder: RIGHT shoulder   Preoperative Diagnosis:  1.     Rotator cuff tear chronic full-thickness rotator cuff tearing supraspinatus- treated with arthroscopic rotator cuff repair - CPT 20261  2.     Shoulder impingement syndrome with significant CA ligament tearing- treated with arthroscopic subacromial decompression with acromioplasty - CPT 86490     Postoperative Diagnosis:  1.     SAME as preoperative diagnoses; long head of the biceps intact, small SLAP tear, no indication for biceps tenodesis     Procedure:  1.     Arthroscopic rotator cuff repair (1x2) - CPT 62284  2.     Arthroscopic subacromial decompression with acromioplasty - CPT 87586    Subjective     Review of Systems   Constitutional: Negative.  Negative for chills, fatigue and  "fever.   HENT: Negative.  Negative for congestion and dental problem.    Eyes: Negative.  Negative for blurred vision.   Respiratory: Negative.  Negative for shortness of breath.    Cardiovascular: Negative.  Negative for leg swelling.   Gastrointestinal: Negative.  Negative for abdominal pain.   Endocrine: Negative.  Negative for polyuria.   Genitourinary: Negative.  Negative for difficulty urinating.   Musculoskeletal:  Positive for arthralgias.   Skin: Negative.    Allergic/Immunologic: Negative.    Neurological: Negative.    Hematological: Negative.  Negative for adenopathy.   Psychiatric/Behavioral: Negative.  Negative for behavioral problems.         I have reviewed and updated the following portions of the patient's history and review of systems: allergies, current medications, past family history, past medical history, past social history, past surgical history and problem list.    Medications: No current outpatient medications on file.    Allergies:   Allergies   Allergen Reactions    Shellfish Allergy Swelling         Objective      Vital Signs:   Vitals:    10/15/24 0816   BP: 110/72   Weight: 82.4 kg (181 lb 9.6 oz)   Height: 179 cm (70.47\")       Ortho Exam:  General: no acute distress, comfortable  Vitals reviewed in chart    Musculoskeletal Exam:    SIDE: Left shoulder  No skin changes  No effusion  Tenderness: anterolateral    Range of motion measurements (degrees): 140/130/60/60  Painful arc of motion: yes  Pain with april's testing  Pain with uppercut  No evidence of septic joint      SIDE: Right shoulder    Tenderness: None  Incisions well healed    Range of motion measurements (degrees): 150/140/60/60  Painful arc of motion: No  Negative lag sign  No evidence of septic joint      Results Review:  XR Shoulder 2+ View Left  Imaging: shoulder x-rays 2 views - AP and axillary x-ray views    Side: LEFT SHOULDER    Indication for shoulder x-ray 2 views: shoulder pain    Comparison: no comparison views " available    Findings: No acute bony pathology. No superior humeral head migration.    The humeral head remains centered in the glenohumeral joint. No evidence   of calcific tendonitis.  Baseline degenerative changes but no acute bony changes left shoulder.    I personally reviewed the above x-rays.         Assessment / Plan      Assessment:   Diagnoses and all orders for this visit:    1. Injury of left rotator cuff, initial encounter (Primary)    2. Acute pain of left shoulder  -     XR Shoulder 2+ View Left  -     MRI Shoulder Left Without Contrast; Future    3. Status post right rotator cuff repair        Quality Metrics:   BMI:   BMI is >= 25 and <30. (Overweight) The following options were offered after discussion;: weight loss educational material (shared in after visit summary)       Tobacco:   John Kay  reports that he has never smoked. He has never been exposed to tobacco smoke. He has never used smokeless tobacco.        Plan:  Left shoulder pain progressively worsening over the last several months. X-ray images reviewed and interpreted today with Dr. Williamson. No evidence of acute bony changes.  He has exhausted conservative measures to include physical therapy and activity modification.  MRI of the left shoulder ordered to further evaluate for rotator cuff tear.  Right shoulder almost 4 months s/p rotator cuff repair.  Doing well in terms of mobility and strengthening.  He will continue with physical therapy once weekly at [facility].  May take over-the-counter anti-inflammatories as needed for pain and swelling.      Follow Up:   Pending left shoulder MRI    History, diagnosis and treatment plan discussed with Dr. Williamson.      Lizet Dey PA-C  [facility] [place] [facility]    Dictated using Dragon Speech Recognition.

## 2024-10-31 ENCOUNTER — TELEPHONE (OUTPATIENT)
Dept: ORTHOPEDIC SURGERY | Facility: CLINIC | Age: 49
End: 2024-10-31
Payer: COMMERCIAL

## 2024-10-31 ENCOUNTER — HOSPITAL ENCOUNTER (OUTPATIENT)
Facility: HOSPITAL | Age: 49
Discharge: HOME | End: 2024-10-31
Payer: COMMERCIAL

## 2024-10-31 DIAGNOSIS — M25.512 ACUTE PAIN OF LEFT SHOULDER: ICD-10-CM

## 2024-10-31 PROCEDURE — 73221 MRI JOINT UPR EXTREM W/O DYE: CPT

## 2024-10-31 NOTE — TELEPHONE ENCOUNTER
HUB OK TO RELAY/SCHEDULE.    PATIENT COMPLETED MRI TODAY. HUB OK TO SCHEDULE NEXT AVAIL F/U WITH CHRIS AT EITHER LOCATION, JUST MAKE SURE DR DORADO IS ALSO IN CLINIC THAT DAY.

## 2024-11-07 ENCOUNTER — OFFICE VISIT (OUTPATIENT)
Dept: ORTHOPEDIC SURGERY | Facility: CLINIC | Age: 49
End: 2024-11-07
Payer: COMMERCIAL

## 2024-11-07 VITALS
SYSTOLIC BLOOD PRESSURE: 110 MMHG | HEIGHT: 72 IN | BODY MASS INDEX: 24.38 KG/M2 | DIASTOLIC BLOOD PRESSURE: 70 MMHG | WEIGHT: 180 LBS

## 2024-11-07 DIAGNOSIS — M75.42 IMPINGEMENT SYNDROME OF LEFT SHOULDER: ICD-10-CM

## 2024-11-07 DIAGNOSIS — M75.52 BURSITIS OF LEFT SHOULDER: ICD-10-CM

## 2024-11-07 DIAGNOSIS — M75.122 NONTRAUMATIC COMPLETE TEAR OF LEFT ROTATOR CUFF: Primary | ICD-10-CM

## 2024-11-07 DIAGNOSIS — M75.22 BICEPS TENDONITIS ON LEFT: ICD-10-CM

## 2024-11-07 DIAGNOSIS — S46.002A INJURY OF LEFT ROTATOR CUFF, INITIAL ENCOUNTER: ICD-10-CM

## 2024-11-07 NOTE — PROGRESS NOTES
[facility] [place] [facility] Follow Up       Office Follow Up Visit       Patient Name: John Kay    Chief Complaint:   Chief Complaint   Patient presents with    Follow-up     3 week MRI follow up--4.5 month s/p Right shoulder RCR with subacromial decompression with acromioplasty (DOS 6/24/24)       Referring Physician: No ref. provider found    History of Present Illness:   John Kay returns to clinic today for follow-up of left shoulder pain.  Last visit on 10/15/2024.  Left shoulder MRI ordered at that time.  He reports no change in symptoms since last visit. He tried moving furniture but had a difficult time due to the left shoulder pain with activity. Not yet returned to the gym since right shoulder surgery.    10/15/24  Ongoing for the last several months.  Denies injury or trauma.  Pain initially started with crossfit. No longer in crossfit. He had right shoulder rotator cuff repair 4 months ago.  Since then he has been in physical therapy and has noticed increased left shoulder pain with certain activity.  Mostly localized to the anterior lateral shoulder. Treatments tried include activity modification and PT.     Right shoulder rotator cuff repair 6/20/2024.  Doing well in regards to right shoulder mobility.  He has started strengthening recently and seeing improvements.  He goes to [facility] in [place].     Date of Surgery: 6/24/2024  Shoulder: RIGHT shoulder   Preoperative Diagnosis:  1.     Rotator cuff tear chronic full-thickness rotator cuff tearing supraspinatus- treated with arthroscopic rotator cuff repair - CPT 61505  2.     Shoulder impingement syndrome with significant CA ligament tearing- treated with arthroscopic subacromial decompression with acromioplasty - CPT 74115     Postoperative Diagnosis:  1.     SAME as preoperative diagnoses; long head of the biceps intact, small SLAP tear, no indication for biceps  "tenodesis     Procedure:  1.     Arthroscopic rotator cuff repair (1x2) - CPT 95326  2.     Arthroscopic subacromial decompression with acromioplasty - CPT 92858    Subjective     Review of Systems   Constitutional: Negative.  Negative for chills, fatigue and fever.   HENT: Negative.  Negative for congestion and dental problem.    Eyes: Negative.  Negative for blurred vision.   Respiratory: Negative.  Negative for shortness of breath.    Cardiovascular: Negative.  Negative for leg swelling.   Gastrointestinal: Negative.  Negative for abdominal pain.   Endocrine: Negative.  Negative for polyuria.   Genitourinary: Negative.  Negative for difficulty urinating.   Musculoskeletal:  Positive for arthralgias.   Skin: Negative.    Allergic/Immunologic: Negative.    Neurological: Negative.    Hematological: Negative.  Negative for adenopathy.   Psychiatric/Behavioral: Negative.  Negative for behavioral problems.         I have reviewed and updated the following portions of the patient's history and review of systems: allergies, current medications, past family history, past medical history, past social history, past surgical history and problem list.    Medications: No current outpatient medications on file.    Allergies:   Allergies   Allergen Reactions    Shellfish Allergy Swelling         Objective      Vital Signs:   Vitals:    11/07/24 1421   BP: 110/70   Weight: 81.6 kg (180 lb)   Height: 182.9 cm (72.01\")       Ortho Exam:  General: no acute distress, comfortable  Vitals reviewed in chart    Musculoskeletal Exam    SIDE: Left  Shoulder Exam:    Tenderness: Rotator cuff    Range of motion measurements (degrees)  Forward flexion/Abduction/External rotation at side/ER at 90/IR at 90/IR position  Active: 140/130/60/60    Painful arc of motion: yes  No evidence of septic joint  Pain with forward flexion and abduction greater: Yes  Impingement test: Painful    Rotator Cuff Testing:  Tenderness to palpation at rotator cuff " -yes  Rotator cuff testing Val's test -painful  Pain with abduction great than 90 degrees -yes    Long head of the biceps testing:  Rebolledo's test for biceps -painful  Bicipital groove tenderness to palpation -yes      Results Review:  MRI Shoulder Left Without Contrast  Narrative: MRI SHOULDER LEFT WO CONTRAST    Date of Exam: 10/31/2024 10:03 AM EDT    Indication: Left shoulder pain, rotator cuff tear suspected.     Comparison: Left shoulder radiographs 10/15/2024    Technique:  Routine multiplanar/multisequence images of the left shoulder were obtained without contrast administration.      Findings:  There is a high-grade, near full-thickness, partial-width, bursal-sided tear of the distal supraspinatus tendon at the footprint (series 9 image 8-11, series 8 image 18). There is a small sentinel cyst at the myotendinous junction of the infraspinatus   (series 8 image 13, series 11 image 17), likely reflecting a poorly visualized interstitial tear of the distal tendon, likely contiguous with the supraspinatus tear. The teres minor tendon and subscapularis tendon are normal. Normal bulk and signal   intensity of the rotator cuff muscle bellies.    The long head of the biceps tendon is intact and normally positioned within the intertubercular groove.    Normal alignment of the glenohumeral joint. There is a physiologic amount of glenohumeral joint fluid. The glenohumeral articular cartilage is grossly preserved. There is fraying and likely low-grade tearing of the superior labrum (series 11 image 12).    The acromioclavicular joint is normal. No os acromiale or subacromial enthesophyte. No significant subacromial-subdeltoid bursal fluid.    No focal bony lesion. No fracture or bony contusion.    No evidence of muscle strain. Normal bulk and signal intensity of the rotator cuff muscle bellies.  Impression: Impression:  High-grade, near full-thickness bursal-sided tear of the supraspinatus tendon. There may be some  contiguous extension into the anterior junctional fibers of the infraspinatus tendon, evidenced by sentinel cyst at the myotendinous junction.    Fraying and questionable low-grade tearing of the superior labrum.    Electronically Signed: Emiliano Hudson MD    11/4/2024 10:53 AM EST    Workstation ID: [id number]       MRI Shoulder Left Without Contrast    Result Date: 11/4/2024  Impression: High-grade, near full-thickness bursal-sided tear of the supraspinatus tendon. There may be some contiguous extension into the anterior junctional fibers of the infraspinatus tendon, evidenced by sentinel cyst at the myotendinous junction. Fraying and questionable low-grade tearing of the superior labrum. Electronically Signed: Emiliano Hudson MD  11/4/2024 10:53 AM EST  Workstation ID: [id number]        Assessment / Plan      Assessment:   Diagnoses and all orders for this visit:    1. Nontraumatic complete tear of left rotator cuff (Primary)  -     External Facility Surgical/Procedural Request; Future    2. Injury of left rotator cuff, initial encounter  -     External Facility Surgical/Procedural Request; Future    3. Biceps tendonitis on left  -     External Facility Surgical/Procedural Request; Future    4. Impingement syndrome of left shoulder    5. Bursitis of left shoulder        Quality Metrics:   BMI:   BMI is within normal parameters. No other follow-up for BMI required.       Tobacco:   John Kay  reports that he has never smoked. He has never been exposed to tobacco smoke. He has never used smokeless tobacco.           Plan:  MRI images left shoulder personally reviewed and interpreted today with Dr. Williamson.  Evidence of full-thickness tear of the supraspinatus without retraction noted. Long head of the biceps appears intact.    Risks and benefits of continued nonoperative management versus surgical management were discussed. The patient desires to proceed with operative intervention.    Rotator cuff repair was  "discussed and sometimes subacromial decompression. We discussed that sometimes the rotator cuff tear is not repairable and may require debridement or partial repair. The procedure is routinely done arthroscopically, but sometimes a larger incision needs to be made to complete the repair in an \"open\" fashion for rare cases.    Specific risks include pain, bleeding, infection, injury to surrounding nerve and blood vessels, fracture, failure or lack of healing of rotator cuff repair, incomplete pain relief, hardware failure, potential need for additional procedures, stiffness after surgery, and potential inability to restore range of motion and strength. Medical, anesthetic, and block complications were additionally discussed.     General anesthesia is required, sling compliance, and compliance with physical therapy and/or a surgeon guided exercise program will be very important to the recovery process.    Discussed pain medications would be used in the early postoperative course.    Diagnoses and CPT Codes  1. Rotator cuff tear - Arthroscopic rotator cuff repair CPT Code 53481    History, diagnosis and treatment plan discussed with Dr. Williamson.    Follow Up:   After LEFT shoulder surgery    Lizet Dey PA-C  [facility] [place] [facility]    Dictated using Dragon Speech Recognition.  "

## 2024-11-11 ENCOUNTER — TELEPHONE (OUTPATIENT)
Dept: ORTHOPEDIC SURGERY | Facility: CLINIC | Age: 49
End: 2024-11-11
Payer: COMMERCIAL

## 2024-11-11 NOTE — TELEPHONE ENCOUNTER
Mr. Kay would like to try physical therapy first. Can you put in an order? He would like to see Herberth Vee @ [facility] in [place].

## 2024-11-12 DIAGNOSIS — M75.42 IMPINGEMENT SYNDROME OF LEFT SHOULDER: ICD-10-CM

## 2024-11-12 DIAGNOSIS — M75.122 NONTRAUMATIC COMPLETE TEAR OF LEFT ROTATOR CUFF: Primary | ICD-10-CM

## 2024-11-12 DIAGNOSIS — S46.002A INJURY OF LEFT ROTATOR CUFF, INITIAL ENCOUNTER: ICD-10-CM

## 2025-04-03 ENCOUNTER — OFFICE VISIT (OUTPATIENT)
Dept: INTERNAL MEDICINE | Facility: CLINIC | Age: 50
End: 2025-04-03
Payer: COMMERCIAL

## 2025-04-03 ENCOUNTER — RESULTS FOLLOW-UP (OUTPATIENT)
Dept: INTERNAL MEDICINE | Facility: CLINIC | Age: 50
End: 2025-04-03

## 2025-04-03 VITALS
RESPIRATION RATE: 18 BRPM | HEIGHT: 70 IN | HEART RATE: 76 BPM | BODY MASS INDEX: 26.2 KG/M2 | SYSTOLIC BLOOD PRESSURE: 110 MMHG | TEMPERATURE: 96.9 F | DIASTOLIC BLOOD PRESSURE: 78 MMHG | WEIGHT: 183 LBS

## 2025-04-03 DIAGNOSIS — E78.2 MIXED HYPERLIPIDEMIA: ICD-10-CM

## 2025-04-03 DIAGNOSIS — R39.14 BENIGN PROSTATIC HYPERPLASIA WITH INCOMPLETE BLADDER EMPTYING: ICD-10-CM

## 2025-04-03 DIAGNOSIS — Z23 ENCOUNTER FOR VACCINATION: ICD-10-CM

## 2025-04-03 DIAGNOSIS — Z98.890 STATUS POST RIGHT ROTATOR CUFF REPAIR: ICD-10-CM

## 2025-04-03 DIAGNOSIS — Z00.00 HEALTHCARE MAINTENANCE: Primary | ICD-10-CM

## 2025-04-03 DIAGNOSIS — N40.1 BENIGN PROSTATIC HYPERPLASIA WITH INCOMPLETE BLADDER EMPTYING: ICD-10-CM

## 2025-04-03 DIAGNOSIS — Z12.5 SCREENING PSA (PROSTATE SPECIFIC ANTIGEN): ICD-10-CM

## 2025-04-03 DIAGNOSIS — M75.112 NONTRAUMATIC INCOMPLETE TEAR OF LEFT ROTATOR CUFF: ICD-10-CM

## 2025-04-03 DIAGNOSIS — M75.121 NONTRAUMATIC COMPLETE TEAR OF RIGHT ROTATOR CUFF: ICD-10-CM

## 2025-04-03 PROBLEM — M75.51 BURSITIS OF RIGHT SHOULDER: Status: RESOLVED | Noted: 2024-03-21 | Resolved: 2025-04-03

## 2025-04-03 PROBLEM — S46.001A INJURY OF RIGHT ROTATOR CUFF: Status: RESOLVED | Noted: 2024-03-21 | Resolved: 2025-04-03

## 2025-04-03 PROBLEM — M75.21 BICEPS TENDINITIS OF RIGHT UPPER EXTREMITY: Status: RESOLVED | Noted: 2024-03-21 | Resolved: 2025-04-03

## 2025-04-03 PROBLEM — M75.122 NONTRAUMATIC COMPLETE TEAR OF LEFT ROTATOR CUFF: Status: ACTIVE | Noted: 2024-04-18

## 2025-04-03 PROBLEM — M25.511 RIGHT SHOULDER PAIN: Status: RESOLVED | Noted: 2024-03-21 | Resolved: 2025-04-03

## 2025-04-03 PROBLEM — M75.21 BICEPS TENDONITIS ON RIGHT: Status: RESOLVED | Noted: 2024-02-02 | Resolved: 2025-04-03

## 2025-04-03 PROBLEM — M12.811 ROTATOR CUFF ARTHROPATHY OF RIGHT SHOULDER: Status: RESOLVED | Noted: 2024-02-02 | Resolved: 2025-04-03

## 2025-04-03 LAB
ALBUMIN SERPL-MCNC: 4.5 G/DL (ref 3.5–5.2)
ALBUMIN/GLOB SERPL: 1.5 G/DL
ALP SERPL-CCNC: 67 U/L (ref 39–117)
ALT SERPL W P-5'-P-CCNC: 37 U/L (ref 1–41)
ANION GAP SERPL CALCULATED.3IONS-SCNC: 8.9 MMOL/L (ref 5–15)
AST SERPL-CCNC: 32 U/L (ref 1–40)
BILIRUB SERPL-MCNC: 0.7 MG/DL (ref 0–1.2)
BUN SERPL-MCNC: 21 MG/DL (ref 6–20)
BUN/CREAT SERPL: 19.6 (ref 7–25)
CALCIUM SPEC-SCNC: 9.5 MG/DL (ref 8.6–10.5)
CHLORIDE SERPL-SCNC: 105 MMOL/L (ref 98–107)
CHOLEST SERPL-MCNC: 246 MG/DL (ref 0–200)
CO2 SERPL-SCNC: 25.1 MMOL/L (ref 22–29)
CREAT SERPL-MCNC: 1.07 MG/DL (ref 0.76–1.27)
EGFRCR SERPLBLD CKD-EPI 2021: 84.5 ML/MIN/1.73
EXPIRATION DATE: NORMAL
GLOBULIN UR ELPH-MCNC: 3 GM/DL
GLUCOSE SERPL-MCNC: 88 MG/DL (ref 65–99)
HBA1C MFR BLD: 5.4 % (ref 4.5–5.7)
HDLC SERPL-MCNC: 39 MG/DL (ref 40–60)
LDLC SERPL CALC-MCNC: 174 MG/DL (ref 0–100)
LDLC/HDLC SERPL: 4.4 {RATIO}
Lab: NORMAL
POTASSIUM SERPL-SCNC: 4.1 MMOL/L (ref 3.5–5.2)
PROT SERPL-MCNC: 7.5 G/DL (ref 6–8.5)
PSA SERPL-MCNC: 0.56 NG/ML (ref 0–4)
SODIUM SERPL-SCNC: 139 MMOL/L (ref 136–145)
TRIGL SERPL-MCNC: 177 MG/DL (ref 0–150)
VLDLC SERPL-MCNC: 33 MG/DL (ref 5–40)

## 2025-04-03 PROCEDURE — 80061 LIPID PANEL: CPT | Performed by: STUDENT IN AN ORGANIZED HEALTH CARE EDUCATION/TRAINING PROGRAM

## 2025-04-03 PROCEDURE — 80053 COMPREHEN METABOLIC PANEL: CPT | Performed by: STUDENT IN AN ORGANIZED HEALTH CARE EDUCATION/TRAINING PROGRAM

## 2025-04-03 PROCEDURE — 83695 ASSAY OF LIPOPROTEIN(A): CPT | Performed by: STUDENT IN AN ORGANIZED HEALTH CARE EDUCATION/TRAINING PROGRAM

## 2025-04-03 PROCEDURE — 82172 ASSAY OF APOLIPOPROTEIN: CPT | Performed by: STUDENT IN AN ORGANIZED HEALTH CARE EDUCATION/TRAINING PROGRAM

## 2025-04-03 PROCEDURE — G0103 PSA SCREENING: HCPCS | Performed by: STUDENT IN AN ORGANIZED HEALTH CARE EDUCATION/TRAINING PROGRAM

## 2025-04-03 RX ORDER — TOBRAMYCIN AND DEXAMETHASONE 3; 1 MG/ML; MG/ML
SUSPENSION/ DROPS OPHTHALMIC
COMMUNITY
Start: 2024-12-13

## 2025-04-03 NOTE — PROGRESS NOTES
Male Physical Note      Date: 2025   Patient Name: John Kay  : 1975   MRN: [id number]     Chief Complaint:    Chief Complaint   Patient presents with    Annual Exam       History of Present Illness: John Kay is a 49 y.o. male who is here today for their annual health maintenance and physical.  History of Present Illness  The patient is a 49-year-old male who presents for health maintenance and chronic care management.    Shoulder Problems  He has been grappling with shoulder issues for the past year, which culminated in a right shoulder rotator cuff tear surgery in . Despite the expectation of a full recovery by December, he believes that other shoulder-related problems have impeded his progress. He has been undergoing physical therapy under the guidance of Dr. Williamson, but the presence of scapular issues has hindered the effectiveness of the repair. He does not believe there is any physiological problem. He reports a history of neck and shoulder issues, likely due to prolonged sitting, which have led to muscle compensation and subsequent weakening. However, he notes an improvement in his condition compared to 3 months ago, particularly in arm mobility. He also reports feeling better after performing home exercises prescribed by his physical therapist. An MRI scan of his left shoulder conducted in November revealed another tear, which was subsequently confirmed as a partial tear by Dr. Williamson. He had scheduled a surgery for this in December, but Dr. Williamson advised him to think about it and not rush into it. He discussed with his PT and decided to do PT on it. He experiences pain in certain situations but does not feel it hinders his daily activities. His current plan involves physical therapy for both shoulders to address any physical issues, with the hope that future surgery, if necessary, will have a faster recovery period. He recalls an injury at home and notes that his shoulders have  been consistently sore since joining [employer]. He admits that joining [employer] was a mistake as he had never previously engaged in shoulder exercises.  - Onset: Shoulder issues for the past year; right shoulder rotator cuff tear surgery in June; MRI scan of left shoulder in November.  - Location: Right shoulder, left shoulder, neck.  - Duration: Ongoing for the past year; improvement noted compared to 3 months ago.  - Character: Shoulder issues, rotator cuff tear, scapular issues, muscle compensation, soreness.  - Alleviating/Aggravating Factors: Physical therapy, home exercises, avoiding [employer].  - Timing: Improvement noted compared to 3 months ago; pain in certain situations.  - Severity: Pain does not hinder daily activities; improvement in arm mobility.    Weight Management  He is currently working on his diet and aims to reduce his weight from 174 to 170 pounds.    General Health Maintenance  He has not undergone a follow-up cholesterol panel. He reports that his blood pressure readings have been within normal limits today. He recently consulted an ophthalmologist and dentist. He uses reading glasses and occasionally wears glasses for astigmatism while watching TV. He has also seen a dermatologist for a growth on his hand that did not heal. He reports no feelings of unsafety or being a victim of abuse. He also reports no feelings of depression, hopelessness, or loss of interest in previously enjoyed activities.    SOCIAL HISTORY  He does not smoke.      Subjective      Review of Systems:   Review of Systems   All other systems reviewed and are negative.      Past Medical History, Social History, Family History and Care Team were all reviewed with patient and updated as appropriate.     Medications:     Current Outpatient Medications:     tobramycin-dexAMETHasone (TOBRADEX) 0.3-0.1 % ophthalmic suspension, USE 1 DROP TO BOTH EYES THREE TIMES DAILY FOR 5 DAYS THEN TWICE DAILY FOR 4 DAYS THEN AT BEDTIME FOR 3  DAYS, Disp: , Rfl:     Zoster Vac Recomb Adjuvanted 50 MCG/0.5ML reconstituted suspension, Inject 0.5 mL into the appropriate muscle as directed by prescriber 1 (One) Time for 1 dose., Disp: 1 each, Rfl: 0    Allergies:   Allergies   Allergen Reactions    Shellfish Allergy Swelling       Immunizations:  Immunization History   Administered Date(s) Administered    COVID-19 (MODERNA) 1st,2nd,3rd Dose Monovalent 03/02/2021, 03/31/2021    COVID-19 (MODERNA) BIVALENT 12+YRS 01/23/2023    COVID-19 (MODERNA) Monovalent Original Booster 11/12/2021    Covid-19 (Pfizer) Gray Cap Monovalent 06/05/2022    Flublok 18+yrs 10/25/2022    Fluzone (or Fluarix & Flulaval for VFC) >6mos 12/01/2015, 01/02/2019, 01/03/2020, 10/16/2020, 11/03/2021, 02/02/2024    Tdap 02/02/2024        Colorectal Screening: Up-to-date  Last Completed Colonoscopy            Upcoming       COLORECTAL CANCER SCREENING (COLONOSCOPY - Preferred) Next due on 1/11/2033 01/11/2023  SCANNED - COLONOSCOPY                          CT for Smoker (Age 50-80, 20pk yr within last 15 years): Up-to-date  Bone Density/DEXA (high risk): Not applicable  Hep C (Age 18-79 once): Previously negative  HIV (Age 15-65 once) : Previously negative  PSA (Over age 50, C Level Recommendation): ordered  US Aorta (For male smokers, age 65): Not applicable  A1c: Ordered  Lipid panel: Ordered      Dermatology: Established  Ophthalmologist: Established  Dentist: Established    Tobacco Use: Low Risk  (4/3/2025)    Patient History     Smoking Tobacco Use: Never     Smokeless Tobacco Use: Never     Passive Exposure: Never       Social History     Substance and Sexual Activity   Alcohol Use Not Currently    Alcohol/week: 1.0 standard drink of alcohol    Comment: occasionally        Social History     Substance and Sexual Activity   Drug Use Never        Diet/Physical activity: Counseled on 04/03/25    PHQ-2 Depression Screening  Little interest or pleasure in doing things? Not at all  "  Feeling down, depressed, or hopeless? Not at all   PHQ-2 Total Score 0         Intimate partner violence: (Screen on initial visit, older adult with injury or evidence of neglect): No concerns  Violence can be a problem in many people's lives, so I now ask every patient about trauma or abuse they may have experienced in a relationship.  Stress/Safety - Do you feel safe in your relationship?  Afraid/Abused - Have you ever been in a relationship where you were threatened, hurt, or afraid?  Friend/Family - Are your friends aware you have been hurt?  Emergency Plan - Do you have a safe place to go and the resources you need in an emergency?    Osteoporosis: No concerns  Men: history of low trauma fracture, androgen deprivation therapy for prostate cancer, hypogonadism, primary hyperparathyroidism, intestinal disorders.     Objective     Physical Exam:  Vital Signs:   Vitals:    04/03/25 0836   BP: 110/78   BP Location: Right arm   Patient Position: Sitting   Cuff Size: Large Adult   Pulse: 76   Resp: 18   Temp: 96.9 °F (36.1 °C)   TempSrc: Temporal   Weight: 83 kg (183 lb)   Height: 179 cm (70.47\")   PainSc: 0-No pain     Body mass index is 25.91 kg/m².     Physical Exam  Vitals and nursing note reviewed.   Constitutional:       General: He is not in acute distress.     Appearance: Normal appearance. He is normal weight. He is not ill-appearing or toxic-appearing.   HENT:      Nose: No congestion or rhinorrhea.   Eyes:      General:         Right eye: No discharge.         Left eye: No discharge.      Conjunctiva/sclera: Conjunctivae normal.   Cardiovascular:      Rate and Rhythm: Normal rate and regular rhythm.      Heart sounds: Normal heart sounds. No murmur heard.     No friction rub.   Pulmonary:      Effort: Pulmonary effort is normal. No respiratory distress.      Breath sounds: Normal breath sounds. No wheezing or rhonchi.   Abdominal:      General: Abdomen is flat. Bowel sounds are normal. There is no " distension.      Palpations: Abdomen is soft. There is no mass.      Tenderness: There is no abdominal tenderness. There is no guarding or rebound.   Musculoskeletal:         General: Tenderness (Active range of motion of his bilateral upper extremities, tenderness focused in his shoulders) present.      Cervical back: Normal range of motion.      Right lower leg: No edema.      Left lower leg: No edema.   Skin:     Findings: No lesion or rash.   Neurological:      General: No focal deficit present.      Mental Status: He is alert. Mental status is at baseline.      Coordination: Coordination normal.      Gait: Gait normal.   Psychiatric:         Mood and Affect: Mood normal.         Behavior: Behavior normal.         Thought Content: Thought content normal.         Judgment: Judgment normal.         Procedures    Assessment / Plan      Assessment/Plan:   Problem List Items Addressed This Visit       Benign prostatic hyperplasia with incomplete bladder emptying    Mixed hyperlipidemia    Relevant Orders    Apolipoprotein B    Lipoprotein A (LPA)    Lipid Panel    Nontraumatic complete tear of right rotator cuff    Status post right rotator cuff repair    Nontraumatic incomplete tear of left rotator cuff     Other Visit Diagnoses         Healthcare maintenance    -  Primary    Relevant Orders    Comprehensive Metabolic Panel    POC Glycosylated Hemoglobin (Hb A1C)      Encounter for vaccination        Relevant Medications    Zoster Vac Recomb Adjuvanted 50 MCG/0.5ML reconstituted suspension      Screening PSA (prostate specific antigen)        Relevant Orders    PSA Screen          The 10-year ASCVD risk score (Frederic BOSS, et al., 2019) is: 4.5%    Values used to calculate the score:      Age: 49 years      Sex: Male      Is Non- : No      Diabetic: No      Tobacco smoker: No      Systolic Blood Pressure: 110 mmHg      Is BP treated: No      HDL Cholesterol: 37 mg/dL      Total Cholesterol: 232  mg/dL  Assessment & Plan  1. Health maintenance  - BMI within optimal range at 25.9  - Slight weight increase possibly due to shoulder issues and decreased physical activity  - Cholesterol panel marginally elevated during last assessment  - Up-to-date with colon cancer screening, next due in 2033  - Recent consultations with ophthalmologist, dentist, and dermatologist for specific growth  - Meets criteria for shingles and pneumonia vaccines  - Comprehensive discussion on benefits and risks of vaccines  - Order cholesterol panel, APO B, lipoprotein A, and A1c tests today  - Advised to maintain balanced diet and regular exercise regimen  - Encouraged to consider annual head-to-toe skin checks with dermatologist    2. Right shoulder rotator cuff tear  - Underwent surgery in June for complete tear  - Continued issues due to surrounding scapular problems and muscle weakness  - Progressing with physical therapy, reports improvement over past three months  - Recommend continued physical therapy and activity modification to avoid additional procedures    3. Left shoulder partial tear  - MRI in November revealed partial tear  - Decided to pursue physical therapy instead of immediate surgery  - Recommend continued physical therapy to strengthen compensatory muscles and potentially avoid surgery  - Surgical options may be reconsidered if symptoms worsen or do not improve    Follow-up  - Patient will follow up in 1 year    PROCEDURE  Procedure Performed  Right shoulder rotator cuff tear surgery in June.    Results  - Laboratory Studies:    - Cholesterol panel was slightly high    - Imaging:    - MRI scan of left shoulder showed a partial tear  Healthcare Maintenance:  Counseling provided based on age appropriate USPSTF guidelines.       John Kay voices understanding and acceptance of this advice and will call back with any further questions or concerns. AVS with preventive healthcare tips printed for patient.     “Discussed  risks/benefits to vaccination, reviewed components of the vaccine, discussed VIS, discussed informed consent, informed consent obtained. Patient/Parent was allowed to accept or refuse vaccine. Questions answered to satisfactory state of patient/Parent. We reviewed typical age appropriate and seasonally appropriate vaccinations. Reviewed immunization history and updated state vaccination form as needed. Patient was counseled on Prevnar 20  Zoster    Follow Up:   Return in about 1 year (around 4/3/2026) for Annual physical.    Patient or patient representative verbalized consent for the use of Ambient Listening during the visit with  Christopher Burrows MD for chart documentation. 4/3/2025  09:18 EDT    Christopher Burrows MD  [facility]

## 2025-04-03 NOTE — PATIENT INSTRUCTIONS

## 2025-04-04 LAB — LPA SERPL-SCNC: 79.6 NMOL/L

## 2025-04-06 LAB — APO B SERPL-MCNC: 149 MG/DL
